# Patient Record
Sex: FEMALE | NOT HISPANIC OR LATINO | URBAN - METROPOLITAN AREA
[De-identification: names, ages, dates, MRNs, and addresses within clinical notes are randomized per-mention and may not be internally consistent; named-entity substitution may affect disease eponyms.]

---

## 2018-02-09 ENCOUNTER — INPATIENT (INPATIENT)
Facility: HOSPITAL | Age: 33
LOS: 0 days | Discharge: HOME | DRG: 640 | End: 2018-02-10
Attending: INTERNAL MEDICINE | Admitting: INTERNAL MEDICINE
Payer: COMMERCIAL

## 2018-02-09 ENCOUNTER — TRANSCRIPTION ENCOUNTER (OUTPATIENT)
Age: 33
End: 2018-02-09

## 2018-02-09 VITALS
SYSTOLIC BLOOD PRESSURE: 103 MMHG | DIASTOLIC BLOOD PRESSURE: 71 MMHG | TEMPERATURE: 98 F | WEIGHT: 110.01 LBS | RESPIRATION RATE: 17 BRPM | HEART RATE: 87 BPM | OXYGEN SATURATION: 100 %

## 2018-02-09 DIAGNOSIS — R63.8 OTHER SYMPTOMS AND SIGNS CONCERNING FOOD AND FLUID INTAKE: ICD-10-CM

## 2018-02-09 DIAGNOSIS — E87.6 HYPOKALEMIA: ICD-10-CM

## 2018-02-09 DIAGNOSIS — Z29.9 ENCOUNTER FOR PROPHYLACTIC MEASURES, UNSPECIFIED: ICD-10-CM

## 2018-02-09 DIAGNOSIS — F50.2 BULIMIA NERVOSA: ICD-10-CM

## 2018-02-09 LAB
ALBUMIN SERPL ELPH-MCNC: 3.5 G/DL — SIGNIFICANT CHANGE UP (ref 3.4–5)
ALBUMIN SERPL ELPH-MCNC: 4.5 G/DL — SIGNIFICANT CHANGE UP (ref 3.4–5)
ALP SERPL-CCNC: 52 U/L — SIGNIFICANT CHANGE UP (ref 40–120)
ALP SERPL-CCNC: 68 U/L — SIGNIFICANT CHANGE UP (ref 40–120)
ALT FLD-CCNC: 18 U/L — SIGNIFICANT CHANGE UP (ref 12–42)
ALT FLD-CCNC: 19 U/L — SIGNIFICANT CHANGE UP (ref 12–42)
ANION GAP SERPL CALC-SCNC: 5 MMOL/L — LOW (ref 9–16)
ANION GAP SERPL CALC-SCNC: 7 MMOL/L — LOW (ref 9–16)
ANION GAP SERPL CALC-SCNC: 7 MMOL/L — SIGNIFICANT CHANGE UP (ref 5–17)
AST SERPL-CCNC: 20 U/L — SIGNIFICANT CHANGE UP (ref 15–37)
AST SERPL-CCNC: 25 U/L — SIGNIFICANT CHANGE UP (ref 15–37)
BASOPHILS NFR BLD AUTO: 0.8 % — SIGNIFICANT CHANGE UP (ref 0–2)
BILIRUB SERPL-MCNC: 0.6 MG/DL — SIGNIFICANT CHANGE UP (ref 0.2–1.2)
BILIRUB SERPL-MCNC: 0.8 MG/DL — SIGNIFICANT CHANGE UP (ref 0.2–1.2)
BUN SERPL-MCNC: 13 MG/DL — SIGNIFICANT CHANGE UP (ref 7–23)
BUN SERPL-MCNC: 18 MG/DL — SIGNIFICANT CHANGE UP (ref 7–23)
BUN SERPL-MCNC: 7 MG/DL — SIGNIFICANT CHANGE UP (ref 7–23)
CALCIUM SERPL-MCNC: 10.4 MG/DL — SIGNIFICANT CHANGE UP (ref 8.5–10.5)
CALCIUM SERPL-MCNC: 9.2 MG/DL — SIGNIFICANT CHANGE UP (ref 8.4–10.5)
CALCIUM SERPL-MCNC: 9.2 MG/DL — SIGNIFICANT CHANGE UP (ref 8.5–10.5)
CHLORIDE SERPL-SCNC: 80 MMOL/L — LOW (ref 96–108)
CHLORIDE SERPL-SCNC: 90 MMOL/L — LOW (ref 96–108)
CHLORIDE SERPL-SCNC: 90 MMOL/L — LOW (ref 96–108)
CK SERPL-CCNC: 71 U/L — SIGNIFICANT CHANGE UP (ref 26–192)
CO2 SERPL-SCNC: 36 MMOL/L — HIGH (ref 22–31)
CO2 SERPL-SCNC: 38 MMOL/L — HIGH (ref 22–31)
CO2 SERPL-SCNC: 42 MMOL/L — CRITICAL HIGH (ref 22–31)
CREAT SERPL-MCNC: 0.53 MG/DL — SIGNIFICANT CHANGE UP (ref 0.5–1.3)
CREAT SERPL-MCNC: 0.65 MG/DL — SIGNIFICANT CHANGE UP (ref 0.5–1.3)
CREAT SERPL-MCNC: 0.84 MG/DL — SIGNIFICANT CHANGE UP (ref 0.5–1.3)
D DIMER BLD IA.RAPID-MCNC: <150 NG/ML DDU — SIGNIFICANT CHANGE UP
EOSINOPHIL NFR BLD AUTO: 2 % — SIGNIFICANT CHANGE UP (ref 0–6)
GLUCOSE SERPL-MCNC: 109 MG/DL — HIGH (ref 70–99)
GLUCOSE SERPL-MCNC: 109 MG/DL — HIGH (ref 70–99)
GLUCOSE SERPL-MCNC: 132 MG/DL — HIGH (ref 70–99)
HCG UR QL: NEGATIVE — SIGNIFICANT CHANGE UP
HCT VFR BLD CALC: 36.1 % — SIGNIFICANT CHANGE UP (ref 34.5–45)
HGB BLD-MCNC: 13.4 G/DL — SIGNIFICANT CHANGE UP (ref 11.5–15.5)
IMM GRANULOCYTES NFR BLD AUTO: 0.1 % — SIGNIFICANT CHANGE UP (ref 0–1.5)
LYMPHOCYTES # BLD AUTO: 35.7 % — SIGNIFICANT CHANGE UP (ref 13–44)
MAGNESIUM SERPL-MCNC: 1.9 MG/DL — SIGNIFICANT CHANGE UP (ref 1.6–2.6)
MAGNESIUM SERPL-MCNC: 2 MG/DL — SIGNIFICANT CHANGE UP (ref 1.6–2.6)
MCHC RBC-ENTMCNC: 32.1 PG — SIGNIFICANT CHANGE UP (ref 27–34)
MCHC RBC-ENTMCNC: 37.1 G/DL — HIGH (ref 32–36)
MCV RBC AUTO: 86.6 FL — SIGNIFICANT CHANGE UP (ref 80–100)
MONOCYTES NFR BLD AUTO: 7.8 % — SIGNIFICANT CHANGE UP (ref 2–14)
NEUTROPHILS NFR BLD AUTO: 53.6 % — SIGNIFICANT CHANGE UP (ref 43–77)
PLATELET # BLD AUTO: 326 K/UL — SIGNIFICANT CHANGE UP (ref 150–400)
POTASSIUM SERPL-MCNC: 1.6 MMOL/L — CRITICAL LOW (ref 3.5–5.3)
POTASSIUM SERPL-MCNC: 1.9 MMOL/L — CRITICAL LOW (ref 3.5–5.3)
POTASSIUM SERPL-MCNC: 2.5 MMOL/L — CRITICAL LOW (ref 3.5–5.3)
POTASSIUM SERPL-SCNC: 1.6 MMOL/L — CRITICAL LOW (ref 3.5–5.3)
POTASSIUM SERPL-SCNC: 1.9 MMOL/L — CRITICAL LOW (ref 3.5–5.3)
POTASSIUM SERPL-SCNC: 2.5 MMOL/L — CRITICAL LOW (ref 3.5–5.3)
PROT SERPL-MCNC: 6.7 G/DL — SIGNIFICANT CHANGE UP (ref 6.4–8.2)
PROT SERPL-MCNC: 8.7 G/DL — HIGH (ref 6.4–8.2)
RBC # BLD: 4.17 M/UL — SIGNIFICANT CHANGE UP (ref 3.8–5.2)
RBC # FLD: 12.3 % — SIGNIFICANT CHANGE UP (ref 10.3–16.9)
SODIUM SERPL-SCNC: 129 MMOL/L — LOW (ref 132–145)
SODIUM SERPL-SCNC: 133 MMOL/L — LOW (ref 135–145)
SODIUM SERPL-SCNC: 133 MMOL/L — SIGNIFICANT CHANGE UP (ref 132–145)
TROPONIN I SERPL-MCNC: 0.21 NG/ML — HIGH (ref 0.02–0.06)
TROPONIN I SERPL-MCNC: 0.23 NG/ML — HIGH (ref 0.02–0.06)
TSH SERPL-MCNC: 5.85 UIU/ML — HIGH (ref 0.36–3.74)
WBC # BLD: 7.5 K/UL — SIGNIFICANT CHANGE UP (ref 3.8–10.5)
WBC # FLD AUTO: 7.5 K/UL — SIGNIFICANT CHANGE UP (ref 3.8–10.5)

## 2018-02-09 PROCEDURE — 99284 EMERGENCY DEPT VISIT MOD MDM: CPT

## 2018-02-09 PROCEDURE — 93010 ELECTROCARDIOGRAM REPORT: CPT

## 2018-02-09 PROCEDURE — 99218: CPT | Mod: 25

## 2018-02-09 PROCEDURE — 93306 TTE W/DOPPLER COMPLETE: CPT | Mod: 26

## 2018-02-09 RX ORDER — POTASSIUM CHLORIDE 20 MEQ
40 PACKET (EA) ORAL ONCE
Qty: 0 | Refills: 0 | Status: COMPLETED | OUTPATIENT
Start: 2018-02-09 | End: 2018-02-09

## 2018-02-09 RX ORDER — POTASSIUM CHLORIDE 20 MEQ
10 PACKET (EA) ORAL
Qty: 0 | Refills: 0 | Status: COMPLETED | OUTPATIENT
Start: 2018-02-09 | End: 2018-02-09

## 2018-02-09 RX ORDER — ASPIRIN/CALCIUM CARB/MAGNESIUM 324 MG
162 TABLET ORAL ONCE
Qty: 0 | Refills: 0 | Status: DISCONTINUED | OUTPATIENT
Start: 2018-02-09 | End: 2018-02-09

## 2018-02-09 RX ORDER — POTASSIUM CHLORIDE 20 MEQ
10 PACKET (EA) ORAL ONCE
Qty: 0 | Refills: 0 | Status: COMPLETED | OUTPATIENT
Start: 2018-02-09 | End: 2018-02-09

## 2018-02-09 RX ORDER — SODIUM CHLORIDE 9 MG/ML
1000 INJECTION INTRAMUSCULAR; INTRAVENOUS; SUBCUTANEOUS ONCE
Qty: 0 | Refills: 0 | Status: COMPLETED | OUTPATIENT
Start: 2018-02-09 | End: 2018-02-09

## 2018-02-09 RX ORDER — SODIUM CHLORIDE 9 MG/ML
500 INJECTION INTRAMUSCULAR; INTRAVENOUS; SUBCUTANEOUS ONCE
Qty: 0 | Refills: 0 | Status: COMPLETED | OUTPATIENT
Start: 2018-02-09 | End: 2018-02-09

## 2018-02-09 RX ORDER — ASPIRIN/CALCIUM CARB/MAGNESIUM 324 MG
162 TABLET ORAL ONCE
Qty: 0 | Refills: 0 | Status: COMPLETED | OUTPATIENT
Start: 2018-02-09 | End: 2018-02-09

## 2018-02-09 RX ADMIN — Medication 100 MILLIEQUIVALENT(S): at 14:00

## 2018-02-09 RX ADMIN — Medication 100 MILLIEQUIVALENT(S): at 12:33

## 2018-02-09 RX ADMIN — Medication 162 MILLIGRAM(S): at 07:49

## 2018-02-09 RX ADMIN — Medication 40 MILLIEQUIVALENT(S): at 12:33

## 2018-02-09 RX ADMIN — Medication 100 MILLIEQUIVALENT(S): at 16:04

## 2018-02-09 RX ADMIN — Medication 40 MILLIEQUIVALENT(S): at 17:13

## 2018-02-09 RX ADMIN — Medication 100 MILLIEQUIVALENT(S): at 20:35

## 2018-02-09 RX ADMIN — Medication 100 MILLIEQUIVALENT(S): at 05:34

## 2018-02-09 RX ADMIN — Medication 100 MILLIEQUIVALENT(S): at 06:30

## 2018-02-09 RX ADMIN — SODIUM CHLORIDE 1000 MILLILITER(S): 9 INJECTION INTRAMUSCULAR; INTRAVENOUS; SUBCUTANEOUS at 05:27

## 2018-02-09 RX ADMIN — Medication 100 MILLIEQUIVALENT(S): at 18:33

## 2018-02-09 RX ADMIN — Medication 100 MILLIEQUIVALENT(S): at 05:27

## 2018-02-09 RX ADMIN — SODIUM CHLORIDE 1000 MILLILITER(S): 9 INJECTION INTRAMUSCULAR; INTRAVENOUS; SUBCUTANEOUS at 21:59

## 2018-02-09 RX ADMIN — Medication 100 MILLIEQUIVALENT(S): at 23:01

## 2018-02-09 RX ADMIN — Medication 100 MILLIEQUIVALENT(S): at 09:26

## 2018-02-09 RX ADMIN — Medication 40 MILLIEQUIVALENT(S): at 04:40

## 2018-02-09 NOTE — H&P ADULT - NSHPLABSRESULTS_GEN_ALL_CORE
LABS:                         13.4   7.5   )-----------( 326      ( 09 Feb 2018 03:41 )             36.1     02-09    133  |  90<L>  |  13  ----------------------------<  109<H>  1.9<LL>   |  38<H>  |  0.65    Ca    9.2      09 Feb 2018 08:06  Mg     1.9     02-09    TPro  6.7  /  Alb  3.5  /  TBili  0.6  /  DBili  x   /  AST  25  /  ALT  18  /  AlkPhos  52  02-09        CARDIAC MARKERS ( 09 Feb 2018 08:05 )  0.206 ng/mL / x     / 71 U/L / x     / x      CARDIAC MARKERS ( 09 Feb 2018 03:41 )  0.235 ng/mL / x     / x     / x     / x                RADIOLOGY, EKG & ADDITIONAL TESTS: Reviewed.

## 2018-02-09 NOTE — CONSULT NOTE ADULT - SUBJECTIVE AND OBJECTIVE BOX
[facility] ([facility]) ER CARDIOLOGY CONSULT NOTE    Patient is a 32y old  Female who presents with a chief complaint of SOB, palpitations    HPI: 32y Female with PMHx of eating disorder (bulimia), and hypokalemia, presents with sudden onset SOB.  Pt was having difficulty sleeping when she had sudden onset of SOB, palpitations, lightheadedness, and nausea at around 2 am this morning.  She presented to [facility] ER and was found to have an EKG consistent with hypokalemia (NSR, ST depressions, U wave, prolonged QTc).  Labs revealed a K of 1.6.  Troponin was mildly elevated at 0.235> 0.206.  There has been no arrhythmia noted on tele.  She denies any recent illness, fever, travel, or change in medications.  She is supposed to be taking K supplement (10 meq) but only takes it once "every couple weeks".  She denies any change in exercise tolerance.    REVIEW OF SYSTEMS: Denies syncope, dizziness, palpitations, chest pain, SOB, change in exercise tolerance, orthopnea, PND, claudication, or edema    PAST MEDICAL & SURGICAL HISTORY:  bulimia  GERD  h/o endoscopy and esophagitis  rhinoplasty  breast reduction  adenoid removal    No Known Drug Allergies    MEDICATIONS  (STANDING):  K, Fe, biotin, melatonin, benadryl    SOCIAL HISTORY:  Tobacco: none  Alcohol: occ  Drugs: none    FAMILY HISTORY: none    Vital Signs Last 24 Hrs  T(C): 36.7 (09 Feb 2018 09:12), Max: 36.8 (09 Feb 2018 05:15)  T(F): 98 (09 Feb 2018 09:12), Max: 98.2 (09 Feb 2018 05:15)  HR: 80 (09 Feb 2018 09:12) (76 - 87)  BP: 107/68 (09 Feb 2018 09:12) (94/66 - 114/73)  BP(mean): --  RR: 17 (09 Feb 2018 09:12) (17 - 18)  SpO2: 100% (09 Feb 2018 09:12) (100% - 100%)  PHYSICAL EXAM:  General: NAD  HEENT: NCAT, MMM, no icterus  Neck: no JVD, no carotid bruits  Cardiovascular: S1/S2 nl, RRR, no MRG  Lungs: CTABL, no WRR  Abdominal: +BS, soft, NT/ND, no RGR  Extremities: warm, dry, pulses 2+ x 4 ext, no varicosities  Skin: no rash, bruising, or bleeding  Neuro: AAOx3, no FND    ECG: NSR 81 bpm IVCD ST dep Uwaves  TELEMETRY: none  BEDSIDE ECHO FINDINGS: grossly normal EF 55%, no gross valve abnormalities, no pericardial effusion    LABS:                        13.4   7.5   )-----------( 326      ( 09 Feb 2018 03:41 )             36.1     02-09    133  |  90<L>  |  13  ----------------------------<  109<H>  1.9<LL>   |  38<H>  |  0.65    Ca    9.2      09 Feb 2018 08:06  Mg     1.9     02-09    TPro  6.7  /  Alb  3.5  /  TBili  0.6  /  DBili  x   /  AST  25  /  ALT  18  /  AlkPhos  52  02-09    CARDIAC MARKERS ( 09 Feb 2018 08:05 )  0.206 ng/mL / x     / 71 U/L / x     / x      CARDIAC MARKERS ( 09 Feb 2018 03:41 )  0.235 ng/mL / x     / x     / x     / x        RADIOLOGY & ADDITIONAL STUDIES:    PRIOR CARDIAC TESTING: none    ASSESSMENT:  32y Female with PMHx of eating disorder (bulimia), and hypokalemia, presents with sudden onset SOB.     PLAN:  1.  +troponin - likely myocardial injury in setting of possible hypotension.  Downtrending.  Continue to follow.  Recommend tele admit while K repleted.  Check formal echo.  2.  Hypokalemia - likely secondary to underlying eating disorder.  Replete.  Monitor.  Goal >4.0.  Mg normal.  Trend EKG.  3.  SOB - resolved  4.  palpitations - may have represented arrhythmia in setting of hypokalemia.  Continue to monitor while K is repleted.  5.  Discussed with pt and Dr. Downey.

## 2018-02-09 NOTE — DISCHARGE NOTE ADULT - MEDICATION SUMMARY - MEDICATIONS TO TAKE
I will START or STAY ON the medications listed below when I get home from the hospital:    potassium chloride 10 mEq oral capsule, extended release  -- 10 milliequivalent(s) by mouth once a day  -- Indication: For Hypokalemia

## 2018-02-09 NOTE — ED ADULT TRIAGE NOTE - CHIEF COMPLAINT QUOTE
pt. ambulated to ER states she was sleeping in bed when she woke up sob, and "blacked out" pt. now c/o palpatations. stats she took a potassium pill prior to sleeping. pt. speaking complete full sentences.

## 2018-02-09 NOTE — ED CDU PROVIDER INITIAL DAY NOTE - PROGRESS NOTE DETAILS
Potassium essentially unchanged.  QT unchanged on repeat EKG.  On cardiac monitor.  Ordered additional IV K+ rider.  Spoke with [facility]/Dr. Pompa who accepts admission to tele floor.  Mag stable, other electrolytes abnl - likely chronic malnutrition/hypokalemia vs surreptitious use of diuretic or laxative although patient does not endorse use.  Uses sleep aids occasionally.  Denies recent blood work > 1 year.  No bulimia episodes > 4 years.

## 2018-02-09 NOTE — H&P ADULT - PROBLEM SELECTOR PLAN 2
Despite history of eating disorder; pt currently denying inducing vomiting. Pt noted increased anxiety and poor appetite 2/2 stress for past few months.   - Pt counseled on hazards of bulimia and electrolyte derangements  - Pt's mother flying in from [place] to help develop diet plan moving forward  - c/w regular diet and encourage PO intake

## 2018-02-09 NOTE — DISCHARGE NOTE ADULT - HOSPITAL COURSE
33 y/o F w/PMH of eating disorder (bulimia) and hypokalemia presenting with sudden onset SOB and palpitations, found to have K 1.6 with EKG consistent with hypokalemia (NSR, ST depressions, U wave, prolonged QTc).  Elevated troponin 0.235 that downtrended to 0.206 s/p 70mEq K and 1L NS bolus.  No arrhythmia was noted on tele. VSS and WNL. Pt also reported increased stress, decreased appetite for past few months, but denied inducing vomiting. Pt transferred to [facility] for tele monitoring and repletion of K. Echo normal. Pt received IV and PO K with normalization of K and EKG upon discharge. 33 y/o F w/PMH of eating disorder (bulimia) and hypokalemia presenting with sudden onset SOB and palpitations, found to have K 1.6 with EKG consistent with hypokalemia (NSR, ST depressions, U wave, prolonged QTc).  Elevated troponin 0.235 that downtrended to 0.206 s/p 70mEq K and 1L NS bolus.  No arrhythmia was noted on tele. VSS and WNL. Pt also reported increased stress, decreased appetite for past few months, but denied inducing vomiting. Pt transferred to [facility] for tele monitoring and repletion of K. Echo normal. Pt received IV and PO K with normalization of K and EKG upon discharge. Stepped down to [facility] for K monitoring. Potassium was stable on discharge. Pt to be discharged home

## 2018-02-09 NOTE — DISCHARGE NOTE ADULT - PATIENT PORTAL LINK FT
You can access the [facility] Patient Portal, offered by [facility], by registering with the following website: [email or web address]

## 2018-02-09 NOTE — DISCHARGE NOTE ADULT - ADDITIONAL INSTRUCTIONS
Please follow up with your primary care doctor 4 weeks after discharge. Please follow up with your primary care doctor after discharge.

## 2018-02-09 NOTE — DISCHARGE NOTE ADULT - PROVIDER TOKENS
FREE:[LAST:[Thony],FIRST:[Miranda],PHONE:[(   )    -],FAX:[(   )    -]] FREE:[LAST:[Thony],FIRST:[Miranda],PHONE:[(   )    -],FAX:[(   )    -],ADDRESS:[Address: [address] [place] [address]  Phone: [phone]]] FREE:[LAST:[Thony],FIRST:[Miranda],PHONE:[(   )    -],FAX:[(   )    -],ADDRESS:[Address: [address] [place] [address]  Phone: [phone]]],TOKEN:'[id number]'

## 2018-02-09 NOTE — ED ADULT NURSE REASSESSMENT NOTE - NS ED NURSE REASSESS COMMENT FT1
rec'd patient A+Ox3 with repeat blood and EKG done- Pt with no current medical complaints will monitor closely

## 2018-02-09 NOTE — DISCHARGE NOTE ADULT - CARE PROVIDER_API CALL
Miranda Bhandari  Phone: (   )    -  Fax: (   )    - Miranda Bhandari  Address: [address] [place] [address]  Phone: [phone]  Phone: (   )    -  Fax: (   )    - Miranda Bhandari  Address: [address] [place] [address]  Phone: [phone]  Phone: (   )    -  Fax: (   )    -    Samra Alexis), [address] [address]  Phone: [phone]  Fax: [phone]

## 2018-02-09 NOTE — ED CDU PROVIDER INITIAL DAY NOTE - SHIFT CHANGE DETAILS
follow up repeat electrolytes, troponin, and cardiology consult. patient signed out at bedside, aware of possible admission.

## 2018-02-09 NOTE — DISCHARGE NOTE ADULT - CARE PLAN
Principal Discharge DX:	Hypokalemia  Goal:	Provide the appropriate treatment in a monitored setting  Assessment and plan of treatment:	You presented to the hospital with acute onset of shortness of breath and palpitations and found to have low potassium in the setting of poor diet for the past few months. Signs of low potassium were noted on EKG, which corrected as potassium was replenished. You were transferred to the ICU step down at [facility] for continued potassium repletion with improvement of symptoms. Echocardiogram showed no abnormalities. Please continue regular diet and potassium supplements upon discharge and follow up with your primary care doctor 4 weeks after discharge.  Secondary Diagnosis:	Bulimia nervosa  Assessment and plan of treatment:	Please follow up with your primary care doctor about further management for this issue.  Secondary Diagnosis:	Nutrition, metabolism, and development symptoms  Assessment and plan of treatment:	Please continue regular diet  Secondary Diagnosis:	Prophylactic measure  Assessment and plan of treatment:	Please continue your medications as prescribed and follow up with your primary care doctor accordingly. Principal Discharge DX:	Hypokalemia  Goal:	Provide the appropriate treatment in a monitored setting  Assessment and plan of treatment:	You presented to the hospital with acute onset of shortness of breath and palpitations and found to have low potassium in the setting of poor diet for the past few months. Signs of low potassium were noted on EKG, which corrected as potassium was replenished. You were transferred to the ICU step down at [facility] for continued potassium repletion with improvement of symptoms. Echocardiogram showed no abnormalities. Please continue regular diet and potassium supplements upon discharge and follow up with your primary care doctor weeks after discharge. If you do not have a PCP, you can initiate care with Dr. Alexis by the calling the number listed below  Secondary Diagnosis:	Bulimia nervosa  Assessment and plan of treatment:	Please follow up with your primary care doctor about further management for this issue.  Secondary Diagnosis:	Nutrition, metabolism, and development symptoms  Assessment and plan of treatment:	Please continue regular diet  Secondary Diagnosis:	Prophylactic measure  Assessment and plan of treatment:	Please continue your medications as prescribed and follow up with your primary care doctor accordingly.

## 2018-02-09 NOTE — H&P ADULT - NSHPPHYSICALEXAM_GEN_ALL_CORE
VITAL SIGNS:  T(C): 37.4 (02-09-18 @ 13:20), Max: 37.4 (02-09-18 @ 13:20)  T(F): 99.3 (02-09-18 @ 13:20), Max: 99.3 (02-09-18 @ 13:20)  HR: 70 (02-09-18 @ 11:26) (70 - 87)  BP: 95/53 (02-09-18 @ 11:26) (94/66 - 114/73)  RR: 18 (02-09-18 @ 11:26) (17 - 18)  SpO2: 100% (02-09-18 @ 11:26) (100% - 100%)    PHYSICAL EXAM:    Constitutional: WDWN resting comfortably in bed; NAD  Head: NC/AT  Eyes: PERRL, EOMI, anicteric sclera  ENT: no nasal discharge; uvula midline, no oropharyngeal erythema or exudates; MMM  Neck: supple; no JVD or thyromegaly  Respiratory: CTA B/L; no W/R/R, no retractions  Cardiac: +S1/S2; RRR; no M/R/G; PMI non-displaced  Gastrointestinal: soft, NT/ND; no rebound or guarding; +BSx4  Genitourinary: no marcano  Back: spine midline, no bony tenderness or step-offs; no CVAT B/L  Extremities: WWP, no clubbing or cyanosis; no peripheral edema  Musculoskeletal: NROM x4; no joint swelling, tenderness or erythema  Vascular: 2+ radial, femoral, DP/PT pulses B/L  Dermatologic: skin warm, dry and intact; no rashes, wounds, or scars  Lymphatic: no submandibular or cervical LAD  Neurologic: AAOx3; no focal deficits  Psychiatric: affect and characteristics of appearance, verbalizations, behaviors are appropriate

## 2018-02-09 NOTE — ED CDU PROVIDER INITIAL DAY NOTE - NOTES
Spoke to dr booker and discussed troponins and ekg findings. patient otherwise clinically stable receiving IV K rider, will see this morning.

## 2018-02-09 NOTE — ED PROVIDER NOTE - OBJECTIVE STATEMENT
patient with hx of eating disorder, notes last exacerbation with hospitalization was 2 yrs ago, presents with acute onset palptiations which awoke her at night. associated with tingling to hands, and legs, and near syncopal episode as she started hyperventilating during episode. was able to walk to ed. denies cp, sob at this time. notes a hx of hypokalemia related to her eating disorders, and takes supplemental K and vitamins. notes recently under severe stress and has been skipping meals and not drinking fluids. denies fever, chills, denies abd pain, N/V/D.

## 2018-02-09 NOTE — DISCHARGE NOTE ADULT - CARE PROVIDERS DIRECT ADDRESSES
,DirectAddress_Unknown ,DirectAddress_Unknown,[email or web address]

## 2018-02-09 NOTE — H&P ADULT - ASSESSMENT
31 y/o F w/PMH of eating disorder (bulimia) and hypokalemia presenting with sudden onset SOB and palpitations, found to have severe hypokalemia 2/2 bulimia/poor PO intake, transferred to [facility] for monitoring and K repletion.

## 2018-02-09 NOTE — ED CDU PROVIDER DISPOSITION NOTE - CLINICAL COURSE
Signed out from Dr. Rubalcava pending repeat blood work.  Potassium essentially unchanged.  QT unchanged on repeat EKG.  On cardiac monitor.  Ordered additional IV K+ rider.  Spoke with [facility]/Dr. Pompa who accepts admission to tele floor.  Mag stable, other electrolytes abnl - likely chronic malnutrition/hypokalemia vs surreptitious use of diuretic or laxative although patient does not endorse use.  Uses sleep aids occasionally.  Denies recent blood work > 1 year.  No bulimia episodes > 4 years.

## 2018-02-09 NOTE — H&P ADULT - HISTORY OF PRESENT ILLNESS
31 y/o F w/PMH of eating disorder (bulimia) and hypokalemia presenting with sudden onset SOB and palpitations.  Around 1-2AM morning of admission, pt was having difficulty sleeping when she had sudden onset of SOB, palpitations, lightheadedness, and nausea, prompting her to walk to [facility] ER.  She was found to have an EKG consistent with hypokalemia (NSR, ST depressions, U wave, prolonged QTc).  Labs s/f K of 1.6 and elevated troponin 0.235 that downtrended to 0.206 s/p 70mEq K and 1L NS bolus.  No arrhythmia was noted on tele. VSS and WNL. Pt denied f/c/n/v/d, CP, cough, TEE, abdominal pain, urinary symptoms, change in BM, recent illness, travel, or change in medications. Pt noted that she hasn't seen her PCP in a year and a half, where she was told that she had low K and was started on 10mEq K; however, she has not taken medication in weeks, but randomly took one last night. Pt also reported increased stress, decreased appetite, but denied inducing vomiting. Pt transferred to [facility] for tele monitoring and repletion of K.

## 2018-02-09 NOTE — PATIENT PROFILE ADULT. - NS TRANSFER PATIENT BELONGINGS
Electronic Device (specify)/Wrist Watch/Jewelry/Money (specify)/headphones, bracelet, @ bedside/Clothing/Cell Phone/PDA (specify)

## 2018-02-09 NOTE — H&P ADULT - NSHPREVIEWOFSYSTEMS_GEN_ALL_CORE
Review of symptoms:   General: No recent weight gain or loss, + fatigue, + difficulty sleeping  Neuro: No new onset weakness, slurred speech, inability to follow commands  Vision: No recent changes in vision  HEENT: No difficulty swallowing  Cardiac:  No chest pain. No dyspnea. + palpitations.  Respiratory: no cough. No dyspnea  Gastrointestinal: No diarrhea. No abdominal pain. No hematochezia. No melena  : No difficulty urinating, no polyuria, no hematuria  Skin: No new unexplained bruising  Lymph nodes: No new lymphadenopathy  Mental Health: Not depressed. No SI/HI +anxiety

## 2018-02-09 NOTE — DISCHARGE NOTE ADULT - PLAN OF CARE
Provide the appropriate treatment in a monitored setting You presented to the hospital with acute onset of shortness of breath and palpitations and found to have low potassium in the setting of poor diet for the past few months. Signs of low potassium were noted on EKG, which corrected as potassium was replenished. You were transferred to the ICU step down at [facility] for continued potassium repletion with improvement of symptoms. Echocardiogram showed no abnormalities. Please continue regular diet and potassium supplements upon discharge and follow up with your primary care doctor 4 weeks after discharge. Please follow up with your primary care doctor about further management for this issue. Please continue regular diet Please continue your medications as prescribed and follow up with your primary care doctor accordingly. You presented to the hospital with acute onset of shortness of breath and palpitations and found to have low potassium in the setting of poor diet for the past few months. Signs of low potassium were noted on EKG, which corrected as potassium was replenished. You were transferred to the ICU step down at [facility] for continued potassium repletion with improvement of symptoms. Echocardiogram showed no abnormalities. Please continue regular diet and potassium supplements upon discharge and follow up with your primary care doctor weeks after discharge. If you do not have a PCP, you can initiate care with Dr. Alexis by the calling the number listed below

## 2018-02-10 VITALS
OXYGEN SATURATION: 100 % | HEART RATE: 78 BPM | TEMPERATURE: 100 F | RESPIRATION RATE: 17 BRPM | SYSTOLIC BLOOD PRESSURE: 87 MMHG | DIASTOLIC BLOOD PRESSURE: 57 MMHG

## 2018-02-10 LAB
ANION GAP SERPL CALC-SCNC: 11 MMOL/L — SIGNIFICANT CHANGE UP (ref 5–17)
ANION GAP SERPL CALC-SCNC: 8 MMOL/L — SIGNIFICANT CHANGE UP (ref 5–17)
ANION GAP SERPL CALC-SCNC: 8 MMOL/L — SIGNIFICANT CHANGE UP (ref 5–17)
BUN SERPL-MCNC: 4 MG/DL — LOW (ref 7–23)
CALCIUM SERPL-MCNC: 9 MG/DL — SIGNIFICANT CHANGE UP (ref 8.4–10.5)
CALCIUM SERPL-MCNC: 9 MG/DL — SIGNIFICANT CHANGE UP (ref 8.4–10.5)
CALCIUM SERPL-MCNC: 9.2 MG/DL — SIGNIFICANT CHANGE UP (ref 8.4–10.5)
CHLORIDE SERPL-SCNC: 102 MMOL/L — SIGNIFICANT CHANGE UP (ref 96–108)
CHLORIDE SERPL-SCNC: 103 MMOL/L — SIGNIFICANT CHANGE UP (ref 96–108)
CHLORIDE SERPL-SCNC: 98 MMOL/L — SIGNIFICANT CHANGE UP (ref 96–108)
CO2 SERPL-SCNC: 24 MMOL/L — SIGNIFICANT CHANGE UP (ref 22–31)
CO2 SERPL-SCNC: 26 MMOL/L — SIGNIFICANT CHANGE UP (ref 22–31)
CO2 SERPL-SCNC: 27 MMOL/L — SIGNIFICANT CHANGE UP (ref 22–31)
CREAT SERPL-MCNC: 0.54 MG/DL — SIGNIFICANT CHANGE UP (ref 0.5–1.3)
CREAT SERPL-MCNC: 0.55 MG/DL — SIGNIFICANT CHANGE UP (ref 0.5–1.3)
CREAT SERPL-MCNC: 0.57 MG/DL — SIGNIFICANT CHANGE UP (ref 0.5–1.3)
GLUCOSE SERPL-MCNC: 108 MG/DL — HIGH (ref 70–99)
GLUCOSE SERPL-MCNC: 132 MG/DL — HIGH (ref 70–99)
GLUCOSE SERPL-MCNC: 92 MG/DL — SIGNIFICANT CHANGE UP (ref 70–99)
MAGNESIUM SERPL-MCNC: 1.8 MG/DL — SIGNIFICANT CHANGE UP (ref 1.6–2.6)
PHOSPHATE SERPL-MCNC: 2.5 MG/DL — SIGNIFICANT CHANGE UP (ref 2.5–4.5)
POTASSIUM SERPL-MCNC: 2.9 MMOL/L — CRITICAL LOW (ref 3.5–5.3)
POTASSIUM SERPL-MCNC: 4.3 MMOL/L — SIGNIFICANT CHANGE UP (ref 3.5–5.3)
POTASSIUM SERPL-MCNC: 4.9 MMOL/L — SIGNIFICANT CHANGE UP (ref 3.5–5.3)
POTASSIUM SERPL-SCNC: 2.9 MMOL/L — CRITICAL LOW (ref 3.5–5.3)
POTASSIUM SERPL-SCNC: 4.3 MMOL/L — SIGNIFICANT CHANGE UP (ref 3.5–5.3)
POTASSIUM SERPL-SCNC: 4.9 MMOL/L — SIGNIFICANT CHANGE UP (ref 3.5–5.3)
SODIUM SERPL-SCNC: 135 MMOL/L — SIGNIFICANT CHANGE UP (ref 135–145)
SODIUM SERPL-SCNC: 136 MMOL/L — SIGNIFICANT CHANGE UP (ref 135–145)
SODIUM SERPL-SCNC: 136 MMOL/L — SIGNIFICANT CHANGE UP (ref 135–145)
T4 AB SER-ACNC: 6.9 UG/DL — SIGNIFICANT CHANGE UP (ref 3.17–11.72)

## 2018-02-10 PROCEDURE — 96376 TX/PRO/DX INJ SAME DRUG ADON: CPT

## 2018-02-10 PROCEDURE — 99285 EMERGENCY DEPT VISIT HI MDM: CPT | Mod: 25

## 2018-02-10 PROCEDURE — G0378: CPT

## 2018-02-10 PROCEDURE — 99231 SBSQ HOSP IP/OBS SF/LOW 25: CPT | Mod: GC

## 2018-02-10 PROCEDURE — 81025 URINE PREGNANCY TEST: CPT

## 2018-02-10 PROCEDURE — 84436 ASSAY OF TOTAL THYROXINE: CPT

## 2018-02-10 PROCEDURE — 84100 ASSAY OF PHOSPHORUS: CPT

## 2018-02-10 PROCEDURE — 82550 ASSAY OF CK (CPK): CPT

## 2018-02-10 PROCEDURE — 85379 FIBRIN DEGRADATION QUANT: CPT

## 2018-02-10 PROCEDURE — 80048 BASIC METABOLIC PNL TOTAL CA: CPT

## 2018-02-10 PROCEDURE — 84443 ASSAY THYROID STIM HORMONE: CPT

## 2018-02-10 PROCEDURE — 93005 ELECTROCARDIOGRAM TRACING: CPT

## 2018-02-10 PROCEDURE — 93306 TTE W/DOPPLER COMPLETE: CPT

## 2018-02-10 PROCEDURE — 36415 COLL VENOUS BLD VENIPUNCTURE: CPT

## 2018-02-10 PROCEDURE — 83735 ASSAY OF MAGNESIUM: CPT

## 2018-02-10 PROCEDURE — 84484 ASSAY OF TROPONIN QUANT: CPT

## 2018-02-10 PROCEDURE — 96374 THER/PROPH/DIAG INJ IV PUSH: CPT

## 2018-02-10 PROCEDURE — 85025 COMPLETE CBC W/AUTO DIFF WBC: CPT

## 2018-02-10 PROCEDURE — 80053 COMPREHEN METABOLIC PANEL: CPT

## 2018-02-10 PROCEDURE — 99239 HOSP IP/OBS DSCHRG MGMT >30: CPT

## 2018-02-10 RX ORDER — POTASSIUM CHLORIDE 20 MEQ
10 PACKET (EA) ORAL
Qty: 0 | Refills: 0 | Status: COMPLETED | OUTPATIENT
Start: 2018-02-10 | End: 2018-02-10

## 2018-02-10 RX ORDER — MAGNESIUM SULFATE 500 MG/ML
1 VIAL (ML) INJECTION ONCE
Qty: 0 | Refills: 0 | Status: COMPLETED | OUTPATIENT
Start: 2018-02-10 | End: 2018-02-10

## 2018-02-10 RX ORDER — POTASSIUM CHLORIDE 20 MEQ
10 PACKET (EA) ORAL
Qty: 0 | Refills: 0 | COMMUNITY

## 2018-02-10 RX ORDER — POTASSIUM CHLORIDE 20 MEQ
40 PACKET (EA) ORAL EVERY 4 HOURS
Qty: 0 | Refills: 0 | Status: COMPLETED | OUTPATIENT
Start: 2018-02-10 | End: 2018-02-10

## 2018-02-10 RX ORDER — POTASSIUM CHLORIDE 20 MEQ
40 PACKET (EA) ORAL EVERY 4 HOURS
Qty: 0 | Refills: 0 | Status: DISCONTINUED | OUTPATIENT
Start: 2018-02-10 | End: 2018-02-10

## 2018-02-10 RX ORDER — POTASSIUM CHLORIDE 20 MEQ
10 PACKET (EA) ORAL
Qty: 30 | Refills: 0 | OUTPATIENT
Start: 2018-02-10

## 2018-02-10 RX ADMIN — Medication 100 MILLIEQUIVALENT(S): at 07:18

## 2018-02-10 RX ADMIN — Medication 40 MILLIEQUIVALENT(S): at 09:59

## 2018-02-10 RX ADMIN — Medication 100 GRAM(S): at 13:52

## 2018-02-10 RX ADMIN — Medication 100 MILLIEQUIVALENT(S): at 03:45

## 2018-02-10 RX ADMIN — Medication 40 MILLIEQUIVALENT(S): at 03:50

## 2018-02-10 RX ADMIN — Medication 40 MILLIEQUIVALENT(S): at 05:35

## 2018-02-10 RX ADMIN — Medication 100 MILLIEQUIVALENT(S): at 05:36

## 2018-02-10 NOTE — DIETITIAN INITIAL EVALUATION ADULT. - ENERGY NEEDS
IBW 59.1Kg  %IBW 84%  BMI 17.8    Utilized ABW to calculate needs, pt falls within % of IBW. Adjusted for repletion needs.

## 2018-02-10 NOTE — DIETITIAN INITIAL EVALUATION ADULT. - PHYSICAL APPEARANCE
underweight/Per NFPE: pt with fat wasting to triceps and muscle wasting to shoulder region (acromion protruding), and some clavicular wasting

## 2018-02-10 NOTE — PROGRESS NOTE ADULT - SUBJECTIVE AND OBJECTIVE BOX
TRANSFER NOTE [facility] to [facility]  HOSPITAL COURSE  33 y/o F w/PMH of eating disorder (bulimia) and hypokalemia presenting with sudden onset SOB and palpitations, found to have K 1.6 with EKG consistent with hypokalemia (NSR, ST depressions, U wave, prolonged QTc).  Elevated troponin 0.235 that downtrended to 0.206 s/p 70mEq K and 1L NS bolus.  No arrhythmia was noted on tele. VSS and WNL. Pt also reported increased stress, decreased appetite for past few months, but denied inducing vomiting. Pt transferred to [facility] for tele monitoring and repletion of K. Echo normal. Pt received IV and PO K with improvement of potassium. The patient was cleared for stepdown to [facility] for continued repletion of potassium.     SUBJECTIVE: Patient reports feeling well overall. States she is anxious to get home. No additional complaints.     Vital Signs Last 12 Hrs  T(F): 97.4 (02-10-18 @ 09:03), Max: 98.2 (02-10-18 @ 02:06)  HR: 69 (02-10-18 @ 10:09) (68 - 80)  BP: 90/55 (02-10-18 @ 10:09) (82/52 - 90/55)  BP(mean): --  RR: 17 (02-10-18 @ 10:09) (17 - 18)  SpO2: 97% (02-10-18 @ 10:09) (97% - 98%)  I&O's Summary    09 Feb 2018 07:01  -  10 Feb 2018 07:00  --------------------------------------------------------  IN: 450 mL / OUT: 0 mL / NET: 450 mL    PHYSICAL EXAM:  Constitutional: NAD, comfortable in bed.  HEENT: NC/AT, PERRLA, EOMI, no conjunctival pallor or scleral icterus, MMM  Neck: Supple, no JVD  Respiratory: Normal rate, rhythm, depth, effort. CTAB. No w/r/r.   Cardiovascular: RRR, normal S1 and S2, no m/r/g.   Gastrointestinal: +BS, soft NTND, no guarding or rebound tenderness, no palpable masses   Extremities: wwp; no cyanosis, clubbing or edema.   Vascular: Pulses equal and strong throughout.   Neurological: AAOx3, no CN deficits, strength and sensation intact throughout.   Skin: No gross skin abnormalities or rashes    LABS:                        13.4   7.5   )-----------( 326      ( 09 Feb 2018 03:41 )             36.1     02-10    136  |  98  |  4<L>  ----------------------------<  108<H>  2.9<LL>   |  27  |  0.54    Ca    9.0      10 Feb 2018 02:17  Mg     1.9     02-09    TPro  6.7  /  Alb  3.5  /  TBili  0.6  /  DBili  x   /  AST  25  /  ALT  18  /  AlkPhos  52  02-09    RADIOLOGY & ADDITIONAL TESTS:    MEDICATIONS  (STANDING):    MEDICATIONS  (PRN):

## 2018-02-10 NOTE — DIETITIAN INITIAL EVALUATION ADULT. - OTHER INFO
33y/o F admitted with hypokalemia. Pt seen resting in bed with mother at bedside. She reports having a good appetite and is eating more then usual; %. Per pt, one of her MDs recommended eating high K foods and is unsure of what to eat. Denies N/V/D/C, pain, and mechanical issues. PTA pt and mother reports very disordered eating habits. Pt states she has a busy job and will go extended periods of time without eating and then excessively over-eat. She has a known h/o bulimia. Per H&P, pt with decreased appetite past few months, but denies inducing vomiting; recommend psych eval. Pt reports UBW ranges 110-115#; stable. Suspected moderate-severe PCM 2/2 disordered eating habits, decreased appetite, and physical assessment; see malnutrition chart note. RD discussed the importance of balanced, consistent meals and provided potassium nutrient list. Pt also requesting OP referral and contact information provided. D/w MD to monitor for refeeding. Will follow.

## 2018-02-10 NOTE — PROGRESS NOTE ADULT - SUBJECTIVE AND OBJECTIVE BOX
TRANSFER ACCEPTANCE NOTE  33 y/o F w/PMH of eating disorder (bulimia) and hypokalemia presenting with sudden onset SOB and palpitations, found to have K 1.6 with EKG consistent with hypokalemia (NSR, ST depressions, U wave, prolonged QTc).  Elevated troponin 0.235 that downtrended to 0.206 s/p 70mEq K and 1L NS bolus.  No arrhythmia was noted on tele. VSS and WNL. Pt also reported increased stress, decreased appetite for past few months, but denied inducing vomiting. Pt transferred to [facility] for tele monitoring and repletion of K. Echo normal. Pt received IV and PO K with improvement of potassium. The patient was cleared for stepdown to [facility] for continued repletion of potassium.       OVERNIGHT EVENTS:    SUBJECTIVE / INTERVAL HPI: Patient seen and examined at bedside.     VITAL SIGNS:  Vital Signs Last 24 Hrs  T(C): 36.3 (10 Feb 2018 09:03), Max: 37.4 (09 Feb 2018 13:20)  T(F): 97.4 (10 Feb 2018 09:03), Max: 99.3 (09 Feb 2018 13:20)  HR: 69 (10 Feb 2018 10:09) (68 - 80)  BP: 90/55 (10 Feb 2018 10:09) (82/52 - 95/53)  BP(mean): 70 (09 Feb 2018 16:04) (68 - 70)  RR: 17 (10 Feb 2018 10:09) (17 - 18)  SpO2: 97% (10 Feb 2018 10:09) (97% - 100%)    PHYSICAL EXAM:    General: WDWN  HEENT: NC/AT; PERRL, anicteric sclera; MMM  Neck: supple  Cardiovascular: +S1/S2; RRR  Respiratory: CTA B/L; no W/R/R  Gastrointestinal: soft, NT/ND; +BSx4  Extremities: WWP; no edema, clubbing or cyanosis  Vascular: 2+ radial, DP/PT pulses B/L  Neurological: AAOx3; no focal deficits    MEDICATIONS:  MEDICATIONS  (STANDING):    MEDICATIONS  (PRN):      ALLERGIES:  Allergies    No Known Allergies    Intolerances        LABS:                        13.4   7.5   )-----------( 326      ( 09 Feb 2018 03:41 )             36.1     02-10    136  |  98  |  4<L>  ----------------------------<  108<H>  2.9<LL>   |  27  |  0.54    Ca    9.0      10 Feb 2018 02:17  Mg     1.9     02-09    TPro  6.7  /  Alb  3.5  /  TBili  0.6  /  DBili  x   /  AST  25  /  ALT  18  /  AlkPhos  52  02-09        CAPILLARY BLOOD GLUCOSE          RADIOLOGY & ADDITIONAL TESTS: Reviewed.    ASSESSMENT:    PLAN: TRANSFER ACCEPTANCE NOTE  31 y/o F w/PMH of eating disorder (bulimia) and hypokalemia presenting with sudden onset SOB and palpitations, found to have K 1.6 with EKG consistent with hypokalemia (NSR, ST depressions, U wave, prolonged QTc).  Elevated troponin 0.235 that downtrended to 0.206 s/p 70mEq K and 1L NS bolus.  No arrhythmia was noted on tele. VSS and WNL. Pt also reported increased stress, decreased appetite for past few months, but denied inducing vomiting. Pt transferred to [facility] for tele monitoring and repletion of K. Echo normal. Pt received IV and PO K with improvement of potassium. 2/10 2am K level was 2/9 and pt was repleted with 40x3PO and 10x3IV potassium. The patient was cleared for stepdown to [facility] for continued repletion of potassium.       OVERNIGHT EVENTS:    SUBJECTIVE / INTERVAL HPI: Patient seen and examined at bedside.     VITAL SIGNS:  Vital Signs Last 24 Hrs  T(C): 36.3 (10 Feb 2018 09:03), Max: 37.4 (09 Feb 2018 13:20)  T(F): 97.4 (10 Feb 2018 09:03), Max: 99.3 (09 Feb 2018 13:20)  HR: 69 (10 Feb 2018 10:09) (68 - 80)  BP: 90/55 (10 Feb 2018 10:09) (82/52 - 95/53)  BP(mean): 70 (09 Feb 2018 16:04) (68 - 70)  RR: 17 (10 Feb 2018 10:09) (17 - 18)  SpO2: 97% (10 Feb 2018 10:09) (97% - 100%)    PHYSICAL EXAM:    General: WDWN  HEENT: NC/AT; PERRL, anicteric sclera; MMM  Neck: supple  Cardiovascular: +S1/S2; RRR  Respiratory: CTA B/L; no W/R/R  Gastrointestinal: soft, NT/ND; +BSx4  Extremities: WWP; no edema, clubbing or cyanosis  Vascular: 2+ radial, DP/PT pulses B/L  Neurological: AAOx3; no focal deficits    MEDICATIONS:  MEDICATIONS  (STANDING):    MEDICATIONS  (PRN):      ALLERGIES:  Allergies    No Known Allergies    Intolerances        LABS:                        13.4   7.5   )-----------( 326      ( 09 Feb 2018 03:41 )             36.1     02-10    136  |  98  |  4<L>  ----------------------------<  108<H>  2.9<LL>   |  27  |  0.54    Ca    9.0      10 Feb 2018 02:17  Mg     1.9     02-09    TPro  6.7  /  Alb  3.5  /  TBili  0.6  /  DBili  x   /  AST  25  /  ALT  18  /  AlkPhos  52  02-09        CAPILLARY BLOOD GLUCOSE          RADIOLOGY & ADDITIONAL TESTS: Reviewed.    ASSESSMENT:    PLAN:

## 2018-02-10 NOTE — PROGRESS NOTE ADULT - PROBLEM SELECTOR PLAN 2
Despite history of eating disorder; pt currently denying inducing vomiting. Pt noted increased anxiety and poor appetite 2/2 stress for past few months.   - Pt counseled on hazards of bulimia and electrolyte derangements  - Pt's mother flown in from [place] to help develop diet plan moving forward  - c/w regular diet and encourage PO intake  - Patient to follow up with Dr. Dickinson in [place] who will provide referral to psychiatric evaluation and care.
Despite history of eating disorder; pt currently denying inducing vomiting. Pt noted increased anxiety and poor appetite 2/2 stress for past few months.   - Pt counseled on hazards of bulimia and electrolyte derangements  - Pt's mother flown in from [place] to help develop diet plan moving forward  - c/w regular diet and encourage PO intake  - Patient to follow up with Dr. Dickinson in OP who will provide referral to psychiatric evaluation and care.

## 2018-02-10 NOTE — DIETITIAN INITIAL EVALUATION ADULT. - PROBLEM SELECTOR PLAN 2
Despite history of eating disorder; pt currently denying inducing vomiting. Pt noted increased anxiety and poor appetite 2/2 stress for past few months.   - Pt counseled on hazards of bulimia and electrolyte derangements  - Pt's mother flying in from [place] to help develop diet plan moving forward  - c/w regular diet and encourage PO intake

## 2018-02-10 NOTE — DIETITIAN INITIAL EVALUATION ADULT. - PROBLEM SELECTOR PLAN 1
Pt w/hx hypokalemia 2/2 bulimia. Pt prescribed K suppliments; however, pt reported poor compliance and medical follow up for past year. Etiology likely 2/2 continued bulimia.  - s/p 70mEq K at [facility]  - c/w aggressive IV and PO K repletion  - tele monitoring  - daily EKG  - f/u echo; bedside echo at [facility] unremarkable

## 2018-02-10 NOTE — PROGRESS NOTE ADULT - PROBLEM SELECTOR PLAN 1
Pt w/hx hypokalemia 2/2 bulimia. Pt prescribed K supplements; however, pt reported poor compliance and medical follow up for past year. Etiology likely 2/2 continued bulimia. Echo unremarkable.   - [facility]  - c/w aggressive IV and PO K repletion  - daily EKG
Pt w/hx hypokalemia 2/2 bulimia. Pt prescribed K supplements; however, pt reported poor compliance and medical follow up for past year. Etiology likely 2/2 continued bulimia. Echo unremarkable.   - [facility]  - c/w aggressive IV and PO K repletion. repleted with 40x3PO and 10x3IV potassium since last BMP check. Will f/u BMP   - daily EKG

## 2018-02-10 NOTE — CHART NOTE - NSCHARTNOTEFT_GEN_A_CORE
Upon Nutritional Assessment by the Registered Dietitian your patient was determined to meet criteria / has evidence of the following diagnosis/diagnoses:          [ ]  Mild Protein Calorie Malnutrition        [X]  Moderate Protein Calorie Malnutrition        [ ] Severe Protein Calorie Malnutrition        [ ] Unspecified Protein Calorie Malnutrition        [X] Underweight / BMI <19        [ ] Morbid Obesity / BMI > 40      Findings as based on:  •  Comprehensive nutrition assessment and consultation  •  Calorie counts (nutrient intake analysis)  •  Food acceptance and intake status from observations by staff  •  Follow up  •  Patient education  •  Intervention secondary to interdisciplinary rounds  •   concerns      Treatment:    The following diet has been recommended:      PROVIDER Section:     By signing this assessment you are acknowledging and agree with the diagnosis/diagnoses assigned by the Registered Dietitian    Comments: Upon Nutritional Assessment by the Registered Dietitian your patient was determined to meet criteria / has evidence of the following diagnosis/diagnoses:          [ ]  Mild Protein Calorie Malnutrition        [X]  Moderate Protein Calorie Malnutrition        [ ] Severe Protein Calorie Malnutrition        [ ] Unspecified Protein Calorie Malnutrition        [X] Underweight / BMI <19        [ ] Morbid Obesity / BMI > 40      Findings as based on:  •  Comprehensive nutrition assessment and consultation  •  Calorie counts (nutrient intake analysis)  •  Food acceptance and intake status from observations by staff  •  Follow up  •  Patient education  •  Intervention secondary to interdisciplinary rounds  •   concerns      Treatment:    The following diet has been recommended: N/A      PROVIDER Section:     By signing this assessment you are acknowledging and agree with the diagnosis/diagnoses assigned by the Registered Dietitian    Comments:    Agree with above nutrition assessment and have addended/edited where appropriate; pt with known eating disorder in the context of prior divorce. Discussed health risks and long term morbidity if she remains malnourished.     Continue medical mgmt of electrolyte derangements, supportive care and education. Pt to follow up with Dr. Dickinson for discharge for primary care and will have repeat blood work. See daily provider progress notes for further information.    Jonathan Wallis, DO

## 2018-02-10 NOTE — PROGRESS NOTE ADULT - ATTENDING COMMENTS
Pt seen and examined, exam as above, labs reviewed.  Reviewed plan with pt and mother.  1. Severe hypokalemia  - given aggressive repletion over last 1.5 days- will f/u 4 pm labs to see if pt's K is stable and if she has been adequately repleted or not  - rest of electrolytes are acceptable  2. Eating disorder- likely bulimia nervosa  - pt denies current eating d/o but eats very little over the course of the day and labs c/w induced vomiting  - d/w pt in private who understands that her habits are life threatening and regards this as a "wake up call" and cahnce to change her life  - will see a dietician outpt at [address]  - discussed increasing calories to 8812-1537/day with balance of fruits/veg/protein/carbs  - pt agreeable to [facility] as outpt  - mother has come and will stay with pt to help get her back on her feet  3. Metabolic alkalosis  - corrected with nutrition and IVF  4. Hyperglycemia  - will obtain HbA1c, possibly stress induced as low susp for DM  5. Severe protein calorie malnutrition  - as above  6. Stress d/o  - as above  Pt would like to go home if electrolytes acceptable; will f/u 4 pm BMP.  Pt agrees to outpt dietician f/u and wants to see a new PMD.  Give give her info for f/u with Dr Alexis on [address] Pt seen and examined, exam as above, labs reviewed.  Reviewed plan with pt and mother.  1. Severe hypokalemia  - given aggressive repletion over last 1.5 days- will f/u 4 pm labs to see if pt's K is stable and if she has been adequately repleted or not  - rest of electrolytes are acceptable  2. Eating disorder- likely bulimia nervosa  - pt denies current eating d/o but eats very little over the course of the day and labs c/w induced vomiting  - d/w pt in private who understands that her habits are life threatening and regards this as a "wake up call" and cahnce to change her life  - will see a dietician outpt at [address]  - discussed increasing calories to 1269-9585/day with balance of fruits/veg/protein/carbs  - pt agreeable to [facility] as outpt  - mother has come and will stay with pt to help get her back on her feet  3. Metabolic alkalosis  - corrected with nutrition and IVF  4. Hyperglycemia  - will obtain HbA1c, possibly stress induced as low susp for DM  5. Severe protein calorie malnutrition  - as above  6. Stress d/o  - as above  7. Thyroid d/o  - f/u FT4 to determine if pt is hypothyroid or this is response to acute illness  Pt would like to go home if electrolytes acceptable; will f/u 4 pm BMP.  Pt agrees to outpt dietician f/u and wants to see a new PMD.  Give give her info for f/u with Dr Alexis on [address]

## 2018-02-10 NOTE — PROGRESS NOTE ADULT - ASSESSMENT
31 y/o F w/PMH of eating disorder (bulimia) and hypokalemia presenting with sudden onset SOB and palpitations, found to have severe hypokalemia 2/2 bulimia/poor PO intake, transferred to [facility] for monitoring and K repletion, now stable to be stepped down to [facility] for continued repletion.
33 y/o F w/PMH of eating disorder (bulimia) and hypokalemia presenting with sudden onset SOB and palpitations, found to have severe hypokalemia 2/2 bulimia/poor PO intake, transferred to [facility] for monitoring and K repletion, now stable to be stepped down to [facility] for continued repletion.

## 2018-02-10 NOTE — PROGRESS NOTE ADULT - PROBLEM SELECTOR PLAN 4
IMPROVE 0, no VTE PPx indicated    FULL CODE  DISPO: 7 Lach
IMPROVE 0, no VTE PPx indicated    FULL CODE  DISPO: 7 Lach

## 2018-02-10 NOTE — DIETITIAN INITIAL EVALUATION ADULT. - ORAL INTAKE PTA
poor/disordered eating; pt and mother reports episodes of not having time to eat and then over eating

## 2018-02-10 NOTE — PROGRESS NOTE ADULT - ATTENDING COMMENTS
awake comfortable, vss stable exam unremarkable.  K improved but needs additional repletion.  Probable duiretic and or laxative abuse    Plans as above  added k repletion  to floor  outpatient fu with Dr. Dickinson, will  need outpatient [facility]

## 2018-02-12 ENCOUNTER — APPOINTMENT (OUTPATIENT)
Dept: INTERNAL MEDICINE | Facility: CLINIC | Age: 33
End: 2018-02-12
Payer: COMMERCIAL

## 2018-02-12 VITALS
OXYGEN SATURATION: 91 % | HEIGHT: 66 IN | SYSTOLIC BLOOD PRESSURE: 90 MMHG | DIASTOLIC BLOOD PRESSURE: 64 MMHG | BODY MASS INDEX: 18.48 KG/M2 | HEART RATE: 70 BPM | TEMPERATURE: 97.9 F | WEIGHT: 115 LBS

## 2018-02-12 DIAGNOSIS — F50.9 EATING DISORDER, UNSPECIFIED: ICD-10-CM

## 2018-02-12 DIAGNOSIS — Z00.00 ENCOUNTER FOR GENERAL ADULT MEDICAL EXAMINATION W/OUT ABNORMAL FINDINGS: ICD-10-CM

## 2018-02-12 DIAGNOSIS — E87.6 HYPOKALEMIA: ICD-10-CM

## 2018-02-12 LAB
BASOPHILS NFR BLD AUTO: 0.8 %
EOSINOPHIL NFR BLD AUTO: 2.7 %
HBA1C MFR BLD HPLC: <4.2 %
HCT VFR BLD CALC: 33.1 %
HGB BLD-MCNC: 11.5 G/DL
LYMPHOCYTES NFR BLD AUTO: 34.2 %
MAN DIFF?: NO
MCHC RBC-ENTMCNC: 32.4 PG
MCHC RBC-ENTMCNC: 34.7 G/DL
MCV RBC AUTO: 93.2 FL
MONOCYTES NFR BLD AUTO: 5.8 %
NEUTROPHILS NFR BLD AUTO: 56.5 %
PLATELET # BLD AUTO: 243 K/UL
RBC # BLD: 3.55 M/UL
RBC # FLD: 13.4 %
WBC # FLD AUTO: 4.8 K/UL

## 2018-02-12 PROCEDURE — 36415 COLL VENOUS BLD VENIPUNCTURE: CPT

## 2018-02-12 PROCEDURE — 99214 OFFICE O/P EST MOD 30 MIN: CPT | Mod: 25

## 2018-02-14 ENCOUNTER — EMERGENCY (EMERGENCY)
Facility: HOSPITAL | Age: 33
LOS: 1 days | Discharge: HOME | End: 2018-02-14
Attending: EMERGENCY MEDICINE | Admitting: EMERGENCY MEDICINE
Payer: COMMERCIAL

## 2018-02-14 VITALS
DIASTOLIC BLOOD PRESSURE: 68 MMHG | SYSTOLIC BLOOD PRESSURE: 119 MMHG | HEART RATE: 117 BPM | RESPIRATION RATE: 18 BRPM | TEMPERATURE: 98 F | OXYGEN SATURATION: 96 %

## 2018-02-14 DIAGNOSIS — R60.0 LOCALIZED EDEMA: ICD-10-CM

## 2018-02-14 DIAGNOSIS — E43 UNSPECIFIED SEVERE PROTEIN-CALORIE MALNUTRITION: ICD-10-CM

## 2018-02-14 DIAGNOSIS — K21.9 GASTRO-ESOPHAGEAL REFLUX DISEASE WITHOUT ESOPHAGITIS: ICD-10-CM

## 2018-02-14 DIAGNOSIS — E87.6 HYPOKALEMIA: ICD-10-CM

## 2018-02-14 DIAGNOSIS — F50.2 BULIMIA NERVOSA: ICD-10-CM

## 2018-02-14 DIAGNOSIS — R73.9 HYPERGLYCEMIA, UNSPECIFIED: ICD-10-CM

## 2018-02-14 DIAGNOSIS — F41.9 ANXIETY DISORDER, UNSPECIFIED: ICD-10-CM

## 2018-02-14 DIAGNOSIS — R74.8 ABNORMAL LEVELS OF OTHER SERUM ENZYMES: ICD-10-CM

## 2018-02-14 DIAGNOSIS — Z79.899 OTHER LONG TERM (CURRENT) DRUG THERAPY: ICD-10-CM

## 2018-02-14 DIAGNOSIS — E87.3 ALKALOSIS: ICD-10-CM

## 2018-02-14 LAB
25(OH)D3 SERPL-MCNC: 36.8 NG/ML
ALBUMIN SERPL ELPH-MCNC: 4.1 G/DL
ALP BLD-CCNC: 48 U/L
ALT SERPL-CCNC: 18 U/L
ANION GAP SERPL CALC-SCNC: 13 MMOL/L
AST SERPL-CCNC: 15 U/L
BILIRUB SERPL-MCNC: 0.5 MG/DL
BUN SERPL-MCNC: 6 MG/DL
CALCIUM SERPL-MCNC: 9.7 MG/DL
CHLORIDE SERPL-SCNC: 101 MMOL/L
CHOLEST SERPL-MCNC: 219 MG/DL
CHOLEST/HDLC SERPL: 3 RATIO
CO2 SERPL-SCNC: 23 MMOL/L
CREAT SERPL-MCNC: 0.58 MG/DL
FOLATE SERPL-MCNC: 12.6 NG/ML
GLUCOSE SERPL-MCNC: 91 MG/DL
HDLC SERPL-MCNC: 74 MG/DL
LDLC SERPL CALC-MCNC: 129 MG/DL
POTASSIUM SERPL-SCNC: 5 MMOL/L
PROT SERPL-MCNC: 6.9 G/DL
SODIUM ?TM SUB UR QN: <20 MMOL/L
SODIUM SERPL-SCNC: 137 MMOL/L
T4 FREE SERPL-MCNC: 1.2 NG/DL
TRIGL SERPL-MCNC: 80 MG/DL
TSH SERPL-ACNC: 0.33 UIU/ML
VIT B12 SERPL-MCNC: 554 PG/ML

## 2018-02-14 PROCEDURE — 99284 EMERGENCY DEPT VISIT MOD MDM: CPT | Mod: 25

## 2018-02-14 PROCEDURE — 93010 ELECTROCARDIOGRAM REPORT: CPT

## 2018-02-14 NOTE — ED ADULT TRIAGE NOTE - CHIEF COMPLAINT QUOTE
I have swelling to my feet and am concerned because I have recently been treated for potassium below 2.0 and I want to make sure it isn't related.

## 2018-02-15 ENCOUNTER — TRANSCRIPTION ENCOUNTER (OUTPATIENT)
Age: 33
End: 2018-02-15

## 2018-02-15 VITALS
DIASTOLIC BLOOD PRESSURE: 65 MMHG | TEMPERATURE: 98 F | RESPIRATION RATE: 16 BRPM | OXYGEN SATURATION: 99 % | HEART RATE: 89 BPM | SYSTOLIC BLOOD PRESSURE: 122 MMHG

## 2018-02-15 LAB
ALBUMIN SERPL ELPH-MCNC: 4 G/DL — SIGNIFICANT CHANGE UP (ref 3.3–5)
ALP SERPL-CCNC: 45 U/L — SIGNIFICANT CHANGE UP (ref 40–120)
ALT FLD-CCNC: 12 U/L — SIGNIFICANT CHANGE UP (ref 10–45)
ANION GAP SERPL CALC-SCNC: 11 MMOL/L — SIGNIFICANT CHANGE UP (ref 5–17)
AST SERPL-CCNC: 14 U/L — SIGNIFICANT CHANGE UP (ref 10–40)
BASOPHILS NFR BLD AUTO: 1.1 % — SIGNIFICANT CHANGE UP (ref 0–2)
BILIRUB SERPL-MCNC: 0.4 MG/DL — SIGNIFICANT CHANGE UP (ref 0.2–1.2)
BUN SERPL-MCNC: 11 MG/DL — SIGNIFICANT CHANGE UP (ref 7–23)
CALCIUM SERPL-MCNC: 9.4 MG/DL — SIGNIFICANT CHANGE UP (ref 8.4–10.5)
CHLORIDE SERPL-SCNC: 98 MMOL/L — SIGNIFICANT CHANGE UP (ref 96–108)
CO2 SERPL-SCNC: 31 MMOL/L — SIGNIFICANT CHANGE UP (ref 22–31)
CREAT SERPL-MCNC: 0.61 MG/DL — SIGNIFICANT CHANGE UP (ref 0.5–1.3)
EOSINOPHIL NFR BLD AUTO: 2.9 % — SIGNIFICANT CHANGE UP (ref 0–6)
GLUCOSE SERPL-MCNC: 90 MG/DL — SIGNIFICANT CHANGE UP (ref 70–99)
HCT VFR BLD CALC: 33.4 % — LOW (ref 34.5–45)
HGB BLD-MCNC: 11.5 G/DL — SIGNIFICANT CHANGE UP (ref 11.5–15.5)
LYMPHOCYTES # BLD AUTO: 45.1 % — HIGH (ref 13–44)
MAGNESIUM SERPL-MCNC: 2.1 MG/DL — SIGNIFICANT CHANGE UP (ref 1.6–2.6)
MCHC RBC-ENTMCNC: 31.9 PG — SIGNIFICANT CHANGE UP (ref 27–34)
MCHC RBC-ENTMCNC: 34.4 G/DL — SIGNIFICANT CHANGE UP (ref 32–36)
MCV RBC AUTO: 92.8 FL — SIGNIFICANT CHANGE UP (ref 80–100)
MONOCYTES NFR BLD AUTO: 5.4 % — SIGNIFICANT CHANGE UP (ref 2–14)
NEUTROPHILS NFR BLD AUTO: 45.5 % — SIGNIFICANT CHANGE UP (ref 43–77)
PLATELET # BLD AUTO: 280 K/UL — SIGNIFICANT CHANGE UP (ref 150–400)
POTASSIUM SERPL-MCNC: 3.7 MMOL/L — SIGNIFICANT CHANGE UP (ref 3.5–5.3)
POTASSIUM SERPL-SCNC: 3.7 MMOL/L — SIGNIFICANT CHANGE UP (ref 3.5–5.3)
PROT SERPL-MCNC: 7.2 G/DL — SIGNIFICANT CHANGE UP (ref 6–8.3)
RBC # BLD: 3.6 M/UL — LOW (ref 3.8–5.2)
RBC # FLD: 13.3 % — SIGNIFICANT CHANGE UP (ref 10.3–16.9)
SODIUM SERPL-SCNC: 140 MMOL/L — SIGNIFICANT CHANGE UP (ref 135–145)
WBC # BLD: 5.2 K/UL — SIGNIFICANT CHANGE UP (ref 3.8–10.5)
WBC # FLD AUTO: 5.2 K/UL — SIGNIFICANT CHANGE UP (ref 3.8–10.5)

## 2018-02-15 PROCEDURE — 85025 COMPLETE CBC W/AUTO DIFF WBC: CPT

## 2018-02-15 PROCEDURE — 36415 COLL VENOUS BLD VENIPUNCTURE: CPT

## 2018-02-15 PROCEDURE — 80053 COMPREHEN METABOLIC PANEL: CPT

## 2018-02-15 PROCEDURE — 93005 ELECTROCARDIOGRAM TRACING: CPT

## 2018-02-15 PROCEDURE — 99283 EMERGENCY DEPT VISIT LOW MDM: CPT | Mod: 25

## 2018-02-15 PROCEDURE — 83735 ASSAY OF MAGNESIUM: CPT

## 2018-02-15 NOTE — ED PROVIDER NOTE - MEDICAL DECISION MAKING DETAILS
rolando Dickinson, will repeat electrolytes and he wants pt to call him tomorrow dw Dr Dickinson, will repeat electrolytes and he wants pt to call him tomorrow.  pt does not want to stay for blood results, wants to call in an hour

## 2018-02-15 NOTE — ED PROVIDER NOTE - OBJECTIVE STATEMENT
noticed swelling in her legs past few days  was admitted last week to hospital for very low potassium probably related to eating disorder.  received a lot of iv fluids in hosptial  saw Dr Dickinson 2 days ago and repeat K was 5  no sob, no cp, no calf pain

## 2018-11-21 ENCOUNTER — APPOINTMENT (RX ONLY)
Dept: URBAN - METROPOLITAN AREA CLINIC 23 | Facility: CLINIC | Age: 33
Setting detail: DERMATOLOGY
End: 2018-11-21

## 2018-11-21 DIAGNOSIS — Z41.9 ENCOUNTER FOR PROCEDURE FOR PURPOSES OTHER THAN REMEDYING HEALTH STATE, UNSPECIFIED: ICD-10-CM

## 2018-11-21 PROBLEM — L70.0 ACNE VULGARIS: Status: ACTIVE | Noted: 2018-11-21

## 2018-11-21 PROCEDURE — ? IN-HOUSE DISPENSING PHARMACY

## 2018-11-21 PROCEDURE — ? PULSED-DYE LASER

## 2018-11-21 PROCEDURE — ? FILLERS

## 2018-11-21 NOTE — HPI: COSMETIC (FILLERS)
Have You Had Fillers Before?: has had fillers
Additional History: Pt states she has no history of cold sores
When Was Your Last Filler Injection?: 09/2018

## 2018-11-21 NOTE — PROCEDURE: FILLERS
Additional Area 3 Location: lateral jaw
[place] Filler  Volume In Cc: 0
Include Cannula Information In Note?: No
Additional Area 3 Location: jawline
Additional Anesthesia Volume In Cc: 6
Filler: Restylane
Lot #: [id number]
Consent: Written consent obtained. Risks include but not limited to bruising, beading, irregular texture, ulceration, infection, allergic reaction, scar formation, incomplete augmentation, temporary nature, procedural pain.
Post-Care Instructions: Patient instructed to apply ice to reduce swelling.
Include Cannula Size?: 25G
Additional Area 5 Location: chin
Additional Area 2 Location: lateral jawline, Left marionette
Detail Level: Zone
Filler: Restylane-L
Additional Area 1 Location: tear through, lateral cheeks
Include Cannula Brand?: DermaSculpt
Price (Use Numbers Only, No Special Characters Or $): 0.00
Anesthesia Type: 1% lidocaine with epinephrine
Anesthesia Volume In Cc: 0.5
Additional Area 2 Location: Lateral cheeks
Additional Area 1 Location: NLF's, lateral cheeks
Include Cannula Length?: 1.5 inch
Lot #: [id number]
Map Statment: See [address] for Complete Details
Expiration Date (Month Year): 04/30/2021
Additional Area 4 Location: ear lobes, cheeks marionettes, tear troughs
Additional Area 1 Volume In Cc: 1

## 2018-11-21 NOTE — PROCEDURE: IN-HOUSE DISPENSING PHARMACY
Product 22 Unit Type: mg
Product 37 Price/Unit (In Dollars): 0
Product 6 Unit Type: grams
Product 8 Amount/Unit (Numbers Only): 3
Product 12 Refills: 2
Product 6 Application Directions: Apply pea size amount to entire face at bedtime only
Product 2 Application Directions: Apply pad to face in the AM and pm as indicated
Product 5 Amount/Unit (Numbers Only): 1
Product 4 Application Directions: Take one tablet Day # 2 and one tablet day # 3 as indicated and resume.
Name Of Product 1: Valium
Name Of Product 2: Hydroquinone pads 6%
Product 10 Application Directions: Take one tablet now in the office
Name Of Product 6: Tretinoin 0.05% cream
Name Of Product 7: Latisse 5ml
Product 2 Unit Type: jar(s)
Product 7 Application Directions: Apply to each eye lashes at bedtime
Name Of Product 4: Prednisone 10 mg
Product 7 Unit Type: bottle(s)
Product 8 Application Directions: Apply to eyelashes nightly
Name Of Product 11: Glycolic acid pads
Name Of Product 3: Prednisone 20mg
Product 3 Unit Type: tablets
Product 1 Price/Unit (In Dollars): 0.00
Product 10 Amount/Unit (Numbers Only): 10
Product 6 Amount/Unit (Numbers Only): [facility]
Product 1 Amount/Unit (Numbers Only): 5
Name Of Product 12: Skin Better Even tone Serum
Render Refills If Set To 0: No
Product 12 Application Directions: Apply to the entire face in the Am and Pm
Product 3 Application Directions: Take 1 tablet today for swelling as indicated today in office.  Take another one tomorrow
Product 9 Application Directions: Take 1 tablet now in office
Product 5 Unit Type: tube(s)
Product 5 Application Directions: Apply thin layer to skin face area at bedtime every other day
Detail Level: Zone
Product 8 Unit Type: ml
Name Of Product 9: Prednisone 10mg
Name Of Product 5: Hydroquinone 4% / Tretinoin 0.05% / Fluocinolone 0.01%
Name Of Product 10: Cetirizine HCL- Antihistamine

## 2018-11-21 NOTE — PROCEDURE: PULSED-DYE LASER
Pulse Duration: 40 ms
Cryogen Time (Ms): [address] [address]
Fluence In J/Cm2 (Optional): 6.50
Delay Time (Ms): [facility]
Location (Required For Details To Render In Note But Body Touch Will Also Count For First Location): mid face
Pulse Duration: 10 ms
Spot Size: 10 mm
Laser Type: Vbeam 595nm
Cryogen Time (Ms): 30
Delay Time (Ms): 20
Treated Area: small area
Spot Size: 7 mm
Post-Care Instructions: I reviewed with the patient in detail post-care instructions. Patient should stay away from the sun and wear sun protection until treated areas are fully healed.
Treated Area: medium area
Spot Size: 10x3 mm
Detail Level: Zone
Consent: Written consent obtained, risks reviewed including but not limited to crusting, scabbing, blistering, scarring, darker or lighter pigmentary change, incidental hair removal, bruising, and/or incomplete removal.
Pulse Duration: 6 ms

## 2018-12-27 ENCOUNTER — RX ONLY (OUTPATIENT)
Age: 33
Setting detail: RX ONLY
End: 2018-12-27

## 2018-12-27 ENCOUNTER — APPOINTMENT (RX ONLY)
Dept: URBAN - METROPOLITAN AREA CLINIC 23 | Facility: CLINIC | Age: 33
Setting detail: DERMATOLOGY
End: 2018-12-27

## 2018-12-27 DIAGNOSIS — Z41.9 ENCOUNTER FOR PROCEDURE FOR PURPOSES OTHER THAN REMEDYING HEALTH STATE, UNSPECIFIED: ICD-10-CM

## 2018-12-27 PROCEDURE — ? FILLERS

## 2018-12-27 RX ORDER — CLINDAMYCIN PHOSPHATE AND BENZOYL PEROXIDE 10; 50 MG/G; MG/G
GEL TOPICAL
Qty: 1 | Refills: 2 | Status: ERX | COMMUNITY
Start: 2018-12-27

## 2018-12-27 NOTE — HPI: OTHER
Condition:: Pt is here to follow up on fillers
Please Describe Your Condition:: Fillers were done November 27th, 2018

## 2018-12-27 NOTE — PROCEDURE: FILLERS
Additional Area 5 Volume In Cc: 0
Expiration Date (Month Year): 04/30/2021
Price (Use Numbers Only, No Special Characters Or $): 0.00
Lot #: [id number]
Anesthesia Volume In Cc: 0.5
Include Cannula Information In Note?: Yes
Use Map Statement For Sites (Optional): No
Expiration Date (Month Year): 05/31/2021
Include Cannula Brand?: DermaSculpt
Additional Anesthesia Volume In Cc: 6
Include Cannula Size?: 25G
Map Statment: See [address] for Complete Details
Additional Area 1 Location: fine lines lateral cheeks, lateral mouth, marionette lines
Additional Area 1 Location: lateral mouth, marionette lines,
Include Cannula Length?: 1.5 inch
Additional Area 2 Location: lateral cheeks, lateral jawline,upper lips and Marionette
Additional Area 2 Location: lateral jawline, Left marionette
Additional Area 3 Location: Lateral Jawline, Marionette lines, Chin fine lines
Filler: Restylane
Additional Area 3 Location: lateral jaw
Additional Area 4 Location: lateral Temples with Cannula, Tear troughs,
Additional Area 5 Location: marionettes, oral commissures
Consent: Written consent obtained. Risks include but not limited to bruising, beading, irregular texture, ulceration, infection, allergic reaction, scar formation, incomplete augmentation, temporary nature, procedural pain.
Nasolabial Folds Filler Volume In Cc: 0.2
Post-Care Instructions: Patient instructed to apply ice to reduce swelling.
Detail Level: Zone
Lot #: [id number]
Anesthesia Type: 1% lidocaine with epinephrine

## 2019-05-06 NOTE — ED ADULT NURSE NOTE - MUSCULOSKELETAL ASSESSMENT
May 6, 2019      Rani Welch MD  [address]  [address]  [place] [address]           [facility] - Hematology Oncology  [address]  [address]  [place] [address]  Phone: [phone]  Fax: [phone]          Patient: Yocasta Almonte   MR Number: [id number]   YOB: 1944   Date of Visit: 5/6/2019       Dear Dr. Rani Welch:    Thank you for referring Yocasta Almonte to me for evaluation. Attached you will find relevant portions of my assessment and plan of care.    If you have questions, please do not hesitate to call me. I look forward to following Yocasta Almonte along with you.    Sincerely,    Esmer Kc RN    Enclosure  CC:  No Recipients    If you would like to receive this communication electronically, please contact [email or web address] or [phone] to request more information on [employer] Link access.    For providers and/or their staff who would like to refer a patient to [facility], please contact us through our one-stop-shop provider referral line, [facility], at [phone].    If you feel you have received this communication in error or would no longer like to receive these types of communications, please e-mail [email or web address]         
- - -

## 2019-08-06 ENCOUNTER — TRANSCRIPTION ENCOUNTER (OUTPATIENT)
Age: 34
End: 2019-08-06

## 2020-11-03 ENCOUNTER — APPOINTMENT (RX ONLY)
Dept: URBAN - METROPOLITAN AREA CLINIC 23 | Facility: CLINIC | Age: 35
Setting detail: DERMATOLOGY
End: 2020-11-03

## 2020-11-03 DIAGNOSIS — T300 ERYTHEMA [FIRST DEGREE], UNSPECIFIED SITE: ICD-10-CM

## 2020-11-03 DIAGNOSIS — L81.0 POSTINFLAMMATORY HYPERPIGMENTATION: ICD-10-CM

## 2020-11-03 DIAGNOSIS — Z41.9 ENCOUNTER FOR PROCEDURE FOR PURPOSES OTHER THAN REMEDYING HEALTH STATE, UNSPECIFIED: ICD-10-CM

## 2020-11-03 PROBLEM — T22.151A BURN OF FIRST DEGREE OF RIGHT SHOULDER, INITIAL ENCOUNTER: Status: ACTIVE | Noted: 2020-11-03

## 2020-11-03 PROCEDURE — ? COUNSELING

## 2020-11-03 PROCEDURE — ? PRESCRIPTION

## 2020-11-03 PROCEDURE — 99213 OFFICE O/P EST LOW 20 MIN: CPT

## 2020-11-03 PROCEDURE — ? PICOWAY LASER

## 2020-11-03 RX ORDER — PIMECROLIMUS 10 MG/G
CREAM TOPICAL BID
Qty: 1 | Refills: 2 | Status: ERX | COMMUNITY
Start: 2020-11-03

## 2020-11-03 RX ADMIN — PIMECROLIMUS: 10 CREAM TOPICAL at 00:00

## 2020-11-03 ASSESSMENT — LOCATION ZONE DERM: LOCATION ZONE: ARM

## 2020-11-03 ASSESSMENT — LOCATION SIMPLE DESCRIPTION DERM
LOCATION SIMPLE: RIGHT SHOULDER
LOCATION SIMPLE: LEFT SHOULDER

## 2020-11-03 ASSESSMENT — LOCATION DETAILED DESCRIPTION DERM
LOCATION DETAILED: LEFT ANTERIOR SHOULDER
LOCATION DETAILED: RIGHT ANTERIOR SHOULDER

## 2020-11-03 NOTE — HPI: DISCOLORATION
How Severe Is Your Skin Discoloration?: moderate
Additional History: Patient states itâs from laser.

## 2020-12-01 ENCOUNTER — RX ONLY (OUTPATIENT)
Age: 35
Setting detail: RX ONLY
End: 2020-12-01

## 2020-12-01 ENCOUNTER — APPOINTMENT (RX ONLY)
Dept: URBAN - METROPOLITAN AREA CLINIC 23 | Facility: CLINIC | Age: 35
Setting detail: DERMATOLOGY
End: 2020-12-01

## 2020-12-01 DIAGNOSIS — Z41.9 ENCOUNTER FOR PROCEDURE FOR PURPOSES OTHER THAN REMEDYING HEALTH STATE, UNSPECIFIED: ICD-10-CM

## 2020-12-01 PROCEDURE — ? PICOWAY LASER

## 2020-12-01 RX ORDER — PIMECROLIMUS 10 MG/G
CREAM TOPICAL BID
Qty: 1 | Refills: 2 | Status: ERX

## 2020-12-01 NOTE — PROCEDURE: [place] LASER
Spot Size: 6.5 mm
Pulse Duration: 2.5 ms
Treatment Number: 2
Wavelength: 532 nm
Post-Procedure Care: Immediate endpoint: perifollicular erythema and edema. Vaseline and ice applied. Post care reviewed with patient.
Handpiece: n/a nm
Detail Level: Detailed
Anesthesia Type: 1% lidocaine with epinephrine
External Cooling Fan Speed: 0
Frequency (Hz): 3 HZ
Total Pulses: 2 passes 725 pulse
Fluence (J/Cm2): 1.5
Consent: Written consent obtained, risks reviewed including but not limited to crusting, scabbing, blistering, scarring, darker or lighter pigmentary change, paradoxical hair regrowth, incomplete removal of hair and infection.
Frequency (Hz): 2 HZ
Location Override: shoulders post inflammatory hypo pigmentation
Handpiece: Resolve
Post-Care Instructions: I reviewed with the patient in detail post-care instructions. Patient should avoid sun for a minimum of 4 weeks before and after treatment.
Spot Size: 2.0 mm
Fluence (J/Cm2): 0.4
Hide Pulse Duration?: No
Pre-Procedure: Prior to proceeding the treatment areas were cleaned and all present put on their eye protection.
Wavelength: 1064 nm

## 2020-12-29 ENCOUNTER — APPOINTMENT (RX ONLY)
Dept: URBAN - METROPOLITAN AREA CLINIC 23 | Facility: CLINIC | Age: 35
Setting detail: DERMATOLOGY
End: 2020-12-29

## 2020-12-29 DIAGNOSIS — Z41.9 ENCOUNTER FOR PROCEDURE FOR PURPOSES OTHER THAN REMEDYING HEALTH STATE, UNSPECIFIED: ICD-10-CM

## 2020-12-29 PROCEDURE — ? PICOWAY LASER

## 2020-12-29 NOTE — PROCEDURE: PICOWAY LASER
Spot Size: 6.5 mm
Pulse Duration: 2.5 ms
Treatment Number: 3
Wavelength: 532 nm
Post-Procedure Care: Immediate endpoint: perifollicular erythema and edema. Vaseline and ice applied. Post care reviewed with patient.
Handpiece: n/a nm
Detail Level: Detailed
Anesthesia Type: 1% lidocaine with epinephrine
External Cooling Fan Speed: 0
Frequency (Hz): 3 HZ
Total Pulses: 2 passes 725 pulse
Fluence (J/Cm2): 1.5
Consent: Written consent obtained, risks reviewed including but not limited to crusting, scabbing, blistering, scarring, darker or lighter pigmentary change, paradoxical hair regrowth, incomplete removal of hair and infection.
Frequency (Hz): 2 HZ
Location Override: shoulders post inflammatory hypo pigmentation
Handpiece: Resolve
Post-Care Instructions: I reviewed with the patient in detail post-care instructions. Patient should avoid sun for a minimum of 4 weeks before and after treatment.
Spot Size: 2.0 mm
Fluence (J/Cm2): 0.4
Hide Pulse Duration?: No
Pre-Procedure: Prior to proceeding the treatment areas were cleaned and all present put on their eye protection.
Wavelength: 1064 nm
Fluence (J/Cm2): 2

## 2021-01-26 ENCOUNTER — APPOINTMENT (RX ONLY)
Dept: URBAN - METROPOLITAN AREA CLINIC 23 | Facility: CLINIC | Age: 36
Setting detail: DERMATOLOGY
End: 2021-01-26

## 2021-01-26 VITALS — TEMPERATURE: 97.7 F

## 2021-01-26 DIAGNOSIS — Z41.9 ENCOUNTER FOR PROCEDURE FOR PURPOSES OTHER THAN REMEDYING HEALTH STATE, UNSPECIFIED: ICD-10-CM

## 2021-01-26 PROCEDURE — ? PULSED-DYE LASER

## 2021-01-26 PROCEDURE — ? PICOWAY LASER

## 2021-01-26 NOTE — PROCEDURE: PULSED-DYE LASER
Post-Procedure Care: Sunscreen applied. Post care reviewed with patient.
Fluence In J/Cm2 (Optional): 4:50
Treated Area: small area
Pulse Duration: 20 ms
Spot Size: 10 mm
Cryogen Time (Ms): [address] [facility] [address] [facility]
Consent: Written consent obtained, risks reviewed including but not limited to crusting, scabbing, blistering, scarring, darker or lighter pigmentary change, incidental hair removal, bruising, and/or incomplete removal.
Cryogen Time (Ms): 30
Delay Time (Ms): 9[facility]
Fluence In J/Cm2 (Optional): 7.00
Location Override: face N/C
Detail Level: Zone
Price (Use Numbers Only, No Special Characters Or $): 0.00
Spot Size: 10x3 mm
Delay Time (Ms): 20
Post-Care Instructions: I reviewed with the patient in detail post-care instructions. Patient should stay away from the sun and wear sun protection until treated areas are fully healed.
Laser Type: Vbeam 595nm
Cryogen Time (Ms): 10
Pulse Duration: 10 ms
Spot Size: 7 mm
Treated Area: medium area
Fluence In J/Cm2 (Optional): 10.0
Pulse Duration: 3 ms

## 2021-02-23 ENCOUNTER — APPOINTMENT (RX ONLY)
Dept: URBAN - METROPOLITAN AREA CLINIC 23 | Facility: CLINIC | Age: 36
Setting detail: DERMATOLOGY
End: 2021-02-23

## 2021-02-23 ENCOUNTER — RX ONLY (OUTPATIENT)
Age: 36
Setting detail: RX ONLY
End: 2021-02-23

## 2021-02-23 VITALS — TEMPERATURE: 97.2 F

## 2021-02-23 DIAGNOSIS — Z41.9 ENCOUNTER FOR PROCEDURE FOR PURPOSES OTHER THAN REMEDYING HEALTH STATE, UNSPECIFIED: ICD-10-CM

## 2021-02-23 PROCEDURE — ? PICOWAY LASER

## 2021-02-23 RX ORDER — PIMECROLIMUS 10 MG/G
CREAM TOPICAL BID
Qty: 1 | Refills: 2 | Status: ERX | COMMUNITY
Start: 2021-02-23

## 2021-02-23 NOTE — PROCEDURE: PICOWAY LASER
Spot Size: 6.5 mm
Total Pulses: 1240 [facility]
Consent: Written consent obtained, risks reviewed including but not limited to crusting, scabbing, blistering, scarring, darker or lighter pigmentary change, paradoxical hair regrowth, incomplete removal of hair and infection.
Spot Size: 2.0 mm
Total Pulses: 2 passes 725 pulse
Frequency (Hz): 6Hz
Wavelength: 532 nm
Post-Care Instructions: I reviewed with the patient in detail post-care instructions. Patient should avoid sun for a minimum of 4 weeks before and after treatment.
Treatment Number: 0
Handpiece: Resolve
Fluence (J/Cm2): 0.6
Fluence (J/Cm2): 2
Fluence (J/Cm2): 1.7
Pre-Procedure: Prior to proceeding the treatment areas were cleaned and all present put on their eye protection.
Post-Procedure Care: Immediate endpoint: perifollicular erythema and edema. Vaseline and ice applied. Post care reviewed with patient.
Handpiece: n/a nm
Hide Pulse Duration?: No
Pulse Duration: 2.5 ms
Wavelength: 1064 nm
Treatment Number: 1
Anesthesia Type: 1% lidocaine with epinephrine
Detail Level: Detailed
Spot Size: 4.0 mm
Spot Size: 6.0 mm

## 2021-03-23 ENCOUNTER — APPOINTMENT (RX ONLY)
Dept: URBAN - METROPOLITAN AREA CLINIC 23 | Facility: CLINIC | Age: 36
Setting detail: DERMATOLOGY
End: 2021-03-23

## 2021-03-23 ENCOUNTER — RX ONLY (OUTPATIENT)
Age: 36
Setting detail: RX ONLY
End: 2021-03-23

## 2021-03-23 VITALS — TEMPERATURE: 97.6 F

## 2021-03-23 DIAGNOSIS — Z41.9 ENCOUNTER FOR PROCEDURE FOR PURPOSES OTHER THAN REMEDYING HEALTH STATE, UNSPECIFIED: ICD-10-CM

## 2021-03-23 PROCEDURE — ? PICOWAY LASER

## 2021-03-23 RX ORDER — MINOCYCLINE 40 MG/G
1 AEROSOL, FOAM TOPICAL QD
Qty: 1 | Refills: 3 | Status: ERX | COMMUNITY
Start: 2021-03-23

## 2021-03-23 NOTE — PROCEDURE: PICOWAY LASER
Spot Size: 2.0 mm
Total Pulses: 2 passes 725 pulse
Total Pulses: 1240 [facility]
Post-Care Instructions: I reviewed with the patient in detail post-care instructions. Patient should avoid sun for a minimum of 4 weeks before and after treatment.
Spot Size: 6.5 mm
External Cooling Fan Speed: 0
Spot Size: 4.0 mm
Wavelength: 532 nm
Handpiece: Resolve
Fluence (J/Cm2): 0.6
Fluence (J/Cm2): 1.5
Frequency (Hz): 5 HZ
Pulse Duration: 2.5 ms
Post-Procedure Care: Immediate endpoint: perifollicular erythema and edema. Vaseline and ice applied. Post care reviewed with patient.
Handpiece: n/a nm
Fluence (J/Cm2): 2
Pre-Procedure: Prior to proceeding the treatment areas were cleaned and all present put on their eye protection.
Hide Pulse Duration?: No
Consent: Written consent obtained, risks reviewed including but not limited to crusting, scabbing, blistering, scarring, darker or lighter pigmentary change, paradoxical hair regrowth, incomplete removal of hair and infection.
Treatment Number: 1
Anesthesia Type: 1% lidocaine with epinephrine
Detail Level: Detailed
Wavelength: 1064 nm

## 2021-05-18 ENCOUNTER — APPOINTMENT (RX ONLY)
Dept: URBAN - METROPOLITAN AREA CLINIC 23 | Facility: CLINIC | Age: 36
Setting detail: DERMATOLOGY
End: 2021-05-18

## 2021-05-18 VITALS — TEMPERATURE: 96.5 F

## 2021-05-18 DIAGNOSIS — Z41.9 ENCOUNTER FOR PROCEDURE FOR PURPOSES OTHER THAN REMEDYING HEALTH STATE, UNSPECIFIED: ICD-10-CM

## 2021-05-18 PROCEDURE — ? PICOWAY LASER

## 2021-05-18 NOTE — PROCEDURE: PICOWAY LASER
Fluence (J/Cm2): 2
Pulse Duration: 2.5 ms
Post-Procedure Care: Immediate endpoint: perifollicular erythema and edema. Vaseline and ice applied. Post care reviewed with patient.
Handpiece: n/a nm
Pre-Procedure: Prior to proceeding the treatment areas were cleaned and all present put on their eye protection.
Hide Pulse Duration?: No
Fluence (J/Cm2): 0.6
Treatment Number: 0
Spot Size: 6.0 mm
Detail Level: Detailed
Wavelength: 532 nm
Anesthesia Type: 1% lidocaine with epinephrine
Wavelength: 1064 nm
Post-Care Instructions: I reviewed with the patient in detail post-care instructions. Patient should avoid sun for a minimum of 4 weeks before and after treatment.
Total Pulses: 1240 [facility]
Spot Size: 6.5 mm
Total Pulses: 6 Hz
Spot Size: 4.0 mm
Consent: Written consent obtained, risks reviewed including but not limited to crusting, scabbing, blistering, scarring, darker or lighter pigmentary change, paradoxical hair regrowth, incomplete removal of hair and infection.
Fluence (J/Cm2): 1.7
Handpiece: Resolve
Total Pulses: 2 passes 725 pulse
Frequency (Hz): 6Hz
Spot Size: 2.0 mm

## 2024-05-21 ENCOUNTER — RX ONLY (OUTPATIENT)
Age: 39
Setting detail: RX ONLY
End: 2024-05-21

## 2024-05-21 RX ORDER — ZOLPIDEM TARTRATE 10 MG/1
TABLET, FILM COATED ORAL
Qty: 10 | Refills: 0 | Status: ACTIVE

## 2025-06-03 ENCOUNTER — APPOINTMENT (OUTPATIENT)
Dept: URBAN - METROPOLITAN AREA CLINIC 23 | Facility: CLINIC | Age: 40
Setting detail: DERMATOLOGY
End: 2025-06-03

## 2025-06-03 DIAGNOSIS — Z41.9 ENCOUNTER FOR PROCEDURE FOR PURPOSES OTHER THAN REMEDYING HEALTH STATE, UNSPECIFIED: ICD-10-CM

## 2025-06-03 PROCEDURE — ? PULSED-DYE LASER

## 2025-06-03 ASSESSMENT — LOCATION ZONE DERM: LOCATION ZONE: FACE

## 2025-06-03 ASSESSMENT — LOCATION SIMPLE DESCRIPTION DERM: LOCATION SIMPLE: LEFT FOREHEAD

## 2025-06-03 ASSESSMENT — LOCATION DETAILED DESCRIPTION DERM: LOCATION DETAILED: LEFT FOREHEAD

## 2025-06-03 NOTE — PROCEDURE: PULSED-DYE LASER
Pulse Duration: .45 ms
Fluence In J/Cm2 (Optional): 7.00
Delay Time (Ms): 20
Cryogen Time (Ms): 30
Consent: Written consent obtained, risks reviewed including but not limited to crusting, scabbing, blistering, scarring, darker or lighter pigmentary change, incidental hair removal, bruising, and/or incomplete removal.
Detail Level: Zone
Fluence In J/Cm2 (Optional): 5.0
Delay Time (Ms): 10
Spot Size: 7 mm
Pulse Duration: 10 ms
Post-Procedure Care: Sunscreen applied. Post care reviewed with patient.
Treated Area: medium area
Spot Size: 10 mm
Location Override: left superior forehead
Pulse Duration: 20 ms
Price (Use Numbers Only, No Special Characters Or $): 0.00
Fluence In J/Cm2 (Optional): 10.0
Post-Care Instructions: I reviewed with the patient in detail post-care instructions. Patient should stay away from the sun and wear sun protection until treated areas are fully healed.
Spot Size: 10x3 mm
Laser Type: Vbeam 595nm
Treated Area: small area

## 2025-06-30 ENCOUNTER — APPOINTMENT (OUTPATIENT)
Dept: URBAN - METROPOLITAN AREA CLINIC 23 | Facility: CLINIC | Age: 40
Setting detail: DERMATOLOGY
End: 2025-06-30

## 2025-06-30 DIAGNOSIS — Z41.9 ENCOUNTER FOR PROCEDURE FOR PURPOSES OTHER THAN REMEDYING HEALTH STATE, UNSPECIFIED: ICD-10-CM

## 2025-06-30 PROCEDURE — ? CANDELA NORDLYS

## 2025-06-30 PROCEDURE — ? PULSED-DYE LASER

## 2025-06-30 NOTE — PROCEDURE: CANDELA NORDLYS
Passes (Will Not Render If Zero): 0
Energy In Mj (Optional): 36.0
Location: full face
Overlap (Optional- Include Units If Applicable): 2
Scan Width (Optional): 4mm
% Coverage: 30
Passes (Optional): 3
Hsv Prophylaxis: no
Hsv Prophylaxis Prescription: Valtrex 500mg BID starting the day of procedures and continuing until all sites healed
Location: forehead
Treatment Number (Will Not Render If Zero): 1
Consent: Written consent obtained, risks reviewed including but not limited to crusting, scabbing, blistering, scarring, darker or lighter pigmentary change, and/or incomplete removal.
Modality: Frax
Post-Care Instructions: I reviewed with the patient in detail post-care instructions.
Eye Shielding Text (Leave Blank If Unwanted- Will Be Inserted If Selecting Eye Shields): The intraocular eye shields were placed. 2 drops of intraocular tetracaine HCL ophthalmic 0.5% solution was administered. The eye shields were coated with ophthalmic bacitracin prior to insertion. After the shields were removed the eyes were flushed with normal saline.
Wavelength (Optional): 1550 nm
Detail Level: Zone

## 2025-06-30 NOTE — PROCEDURE: PULSED-DYE LASER
Delay Time (Ms): 20
Treated Area: small area
Spot Size: 10 mm
Pulse Duration: 10 ms
Cryogen Time (Ms): 30
Laser Type: Vbeam 595nm
Location Override: left forehead spot
Fluence In J/Cm2 (Optional): 7.00
Spot Size: 7 mm
Consent: Written consent obtained, risks reviewed including but not limited to crusting, scabbing, blistering, scarring, darker or lighter pigmentary change, incidental hair removal, bruising, and/or incomplete removal.
Post-Procedure Care: Vaseline and ice applied. Post care reviewed with patient.
Pulse Duration: .45 ms
Post-Care Instructions: I reviewed with the patient in detail post-care instructions. Patient should stay away from the sun and wear sun protection until treated areas are fully healed.
Price (Use Numbers Only, No Special Characters Or $): 048
Fluence In J/Cm2 (Optional): 4.75
Detail Level: Detailed